# Patient Record
Sex: FEMALE | Race: WHITE | Employment: OTHER | URBAN - METROPOLITAN AREA
[De-identification: names, ages, dates, MRNs, and addresses within clinical notes are randomized per-mention and may not be internally consistent; named-entity substitution may affect disease eponyms.]

---

## 2020-01-11 ENCOUNTER — HOSPITAL ENCOUNTER (EMERGENCY)
Age: 85
Discharge: HOME | End: 2020-01-11
Attending: EMERGENCY MEDICINE
Payer: MEDICARE

## 2020-01-11 ENCOUNTER — APPOINTMENT (OUTPATIENT)
Dept: CT IMAGING | Age: 85
End: 2020-01-11
Attending: EMERGENCY MEDICINE
Payer: MEDICARE

## 2020-01-11 VITALS
DIASTOLIC BLOOD PRESSURE: 63 MMHG | RESPIRATION RATE: 17 BRPM | HEART RATE: 64 BPM | SYSTOLIC BLOOD PRESSURE: 130 MMHG | OXYGEN SATURATION: 97 %

## 2020-01-11 DIAGNOSIS — S09.90XA INJURY OF HEAD, INITIAL ENCOUNTER: Primary | ICD-10-CM
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
## 2020-01-11 DIAGNOSIS — S10.93XA CONTUSION OF NECK, INITIAL ENCOUNTER: ICD-10-CM

## 2020-01-11 PROCEDURE — 72125 CT NECK SPINE W/O DYE: CPT

## 2020-01-11 PROCEDURE — 99282 EMERGENCY DEPT VISIT SF MDM: CPT | Performed by: EMERGENCY MEDICINE

## 2020-01-11 PROCEDURE — 70450 CT HEAD/BRAIN W/O DYE: CPT

## 2020-01-11 RX ORDER — BUMETANIDE 2 MG/1
2 TABLET ORAL DAILY
Status: ON HOLD | COMMUNITY
End: 2020-12-09

## 2020-01-11 RX ORDER — ESTRADIOL 0.5 MG/1
TABLET ORAL DAILY
COMMUNITY

## 2020-01-11 RX ORDER — ASCORBIC ACID 500 MG
1000 TABLET ORAL DAILY
Status: ON HOLD | COMMUNITY
End: 2020-12-09

## 2020-01-11 RX ORDER — MODAFINIL 200 MG/1
200 TABLET ORAL DAILY
COMMUNITY

## 2020-01-11 RX ORDER — COLCHICINE 0.6 MG/1
0.6 TABLET ORAL DAILY
Status: ON HOLD | COMMUNITY
End: 2020-12-09

## 2020-01-11 RX ORDER — ESCITALOPRAM OXALATE 10 MG/1
20 TABLET ORAL DAILY
COMMUNITY

## 2020-01-11 RX ORDER — ERGOCALCIFEROL 1.25 MG/1
50000 CAPSULE ORAL
COMMUNITY

## 2020-01-11 RX ORDER — RIVASTIGMINE TARTRATE 3 MG/1
3 CAPSULE ORAL 2 TIMES DAILY WITH MEALS
COMMUNITY

## 2020-01-11 RX ORDER — TRAMADOL HYDROCHLORIDE 50 MG/1
50 TABLET ORAL
Status: ON HOLD | COMMUNITY
End: 2020-12-09 | Stop reason: SDUPTHER

## 2020-01-11 RX ORDER — MEMANTINE HYDROCHLORIDE 10 MG/1
10 TABLET ORAL 2 TIMES DAILY
COMMUNITY

## 2020-01-11 RX ORDER — CARBIDOPA AND LEVODOPA 25; 100 MG/1; MG/1
1 TABLET ORAL 3 TIMES DAILY
Status: ON HOLD | COMMUNITY
End: 2020-12-09

## 2020-01-11 RX ORDER — CALCIUM CARB/VITAMIN D3/VIT K1 650MG-12.5
TABLET,CHEWABLE ORAL
COMMUNITY

## 2020-01-11 RX ORDER — ASPIRIN 81 MG/1
81 TABLET ORAL DAILY
COMMUNITY

## 2020-01-11 RX ORDER — GUAIFENESIN 100 MG/5ML
200 SOLUTION ORAL
Status: ON HOLD | COMMUNITY
End: 2020-12-09

## 2020-01-11 RX ORDER — AMOXICILLIN AND CLAVULANATE POTASSIUM 500; 125 MG/1; MG/1
1 TABLET, FILM COATED ORAL 2 TIMES DAILY
Status: ON HOLD | COMMUNITY
End: 2020-12-09

## 2020-01-11 NOTE — ED PROVIDER NOTES
The history is provided by the patient and a relative. Fall   The accident occurred 1 to 2 hours ago. The fall occurred while standing. She fell from a height of ground level. She landed on hard floor. The point of impact was the head and neck. The pain is present in the head and neck. The pain is mild. She was ambulatory at the scene. There was no entrapment after the fall. There was no drug use involved in the accident. There was no alcohol use involved in the accident. Associated symptoms include headaches. Pertinent negatives include no numbness, no abdominal pain, no nausea, no vomiting, no extremity weakness, no loss of consciousness and no tingling. The risk factors include dementia and being elderly (On aspirin). The symptoms are aggravated by pressure on injury. She has tried nothing for the symptoms. Past Medical History:   Diagnosis Date    Dementia [facility])        History reviewed. No pertinent surgical history. History reviewed. No pertinent family history.     Social History     Socioeconomic History    Marital status:      Spouse name: Not on file    Number of children: Not on file    Years of education: Not on file    Highest education level: Not on file   Occupational History    Not on file   Social Needs    Financial resource strain: Not on file    Food insecurity:     Worry: Not on file     Inability: Not on file    Transportation needs:     Medical: Not on file     Non-medical: Not on file   Tobacco Use    Smoking status: Never Smoker    Smokeless tobacco: Never Used   Substance and Sexual Activity    Alcohol use: Never     Frequency: Never    Drug use: Never    Sexual activity: Not on file   Lifestyle    Physical activity:     Days per week: Not on file     Minutes per session: Not on file    Stress: Not on file   Relationships    Social connections:     Talks on phone: Not on file     Gets together: Not on file     Attends [employer] service: Not on file Active member of club or organization: Not on file     Attends meetings of clubs or organizations: Not on file     Relationship status: Not on file    Intimate partner violence:     Fear of current or ex partner: Not on file     Emotionally abused: Not on file     Physically abused: Not on file     Forced sexual activity: Not on file   Other Topics Concern    Not on file   Social History Narrative    Not on file         ALLERGIES: Cortisone; Morphine; and Sulfa (sulfonamide antibiotics)    Review of Systems   HENT: Negative for facial swelling. Respiratory: Negative for shortness of breath. Cardiovascular: Negative for chest pain. Gastrointestinal: Negative for abdominal pain, nausea and vomiting. Musculoskeletal: Positive for neck pain. Negative for back pain and extremity weakness. Neurological: Positive for headaches. Negative for tingling, loss of consciousness, weakness and numbness. There were no vitals filed for this visit. Physical Exam  Vitals signs and nursing note reviewed. Constitutional:       Appearance: She is well-developed. HENT:      Head: Normocephalic and atraumatic. Comments: No visible contusion abrasion or laceration on the posterior scalp there is an area of tenderness in the occipital prominence area. Patient complains of mild pain here. Eyes:      Conjunctiva/sclera: Conjunctivae normal.      Pupils: Pupils are equal, round, and reactive to light. Neck:      Musculoskeletal: Spinous process tenderness and muscular tenderness present. Trachea: Trachea normal.   Cardiovascular:      Rate and Rhythm: Normal rate and regular rhythm. Heart sounds: Normal heart sounds. Pulmonary:      Effort: Pulmonary effort is normal.      Breath sounds: Normal breath sounds. Chest:      Chest wall: No tenderness. Abdominal:      General: Bowel sounds are normal. There is no distension. Palpations: Abdomen is soft. Tenderness:  There is no tenderness. There is no guarding or rebound. Musculoskeletal: Normal range of motion. General: No tenderness. Comments: No midline thoracic or lumbar tenderness. Extremities with full range of motion without pain and no bony tenderness to palpation. Pelvis is stable and nontender. Skin:     General: Skin is warm and dry. Neurological:      Mental Status: She is alert and oriented to person, place, and time. GCS: GCS eye subscore is 4. GCS verbal subscore is 5. GCS motor subscore is 6. Sensory: No sensory deficit. MDM  Number of Diagnoses or Management Options  Diagnosis management comments: Patient is alert and remembers the incident for the most part. She is a little uncertain as to why she fell but she does remember standing up and wanting to use her walker. She is at her baseline demented mental status per family who is here with her. The fall was witnessed and there was no loss of consciousness. 8:12 PM  Patient and family were unaware of the old stroke we found on her CT scan but she was started on an antibiotic yesterday for sinusitis of which the CT shows signs as well. They were unaware of the spinal canal stenosis of the cervical spine but she is not having any radicular symptoms. Amount and/or Complexity of Data Reviewed  Tests in the radiology section of CPT®: ordered and reviewed (Ct Head Wo Cont    Result Date: 1/11/2020  CT of the head without contrast. CLINICAL INDICATION: Head trauma after a fall PROCEDURE: Serial thin section axial images are obtained from the cranial vertex through the skull base without the administration of intravenous contrast. Radiation dose reduction techniques were used for this study. Our CT scanners use one or all of the following: Automated exposure control, adjusted of the mA and/or kV according to patient size, iterative reconstruction COMPARISON: No prior.  FINDINGS: There is no acute intracranial hemorrhage, mass, or mass effect. No abnormal extra-axial fluid collections identified. There is no hydrocephalus. The basilar cisterns are widely patent. Encephalomalacia is noted in the paramedian left frontal lobe in keeping with prior CVA. There is moderate patchy subcortical, deep, and periventricular white matter hypoattenuation bilaterally. Mild atrophy is noted. No skull fracture or aggressive osseous lesion noted. Patchy opacification is noted throughout the right frontal, right greater than left ethmoid air cells with trace air-fluid levels in the maxillary sinuses. The mastoid air cells are clear. IMPRESSION: 1. No acute intracranial abnormality. 2. Moderate chronic white matter microangiopathic changes. 3. 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 4. Mild cerebral atrophy. 5. Nonspecific patchy opacification involving the right frontal, bilateral right greater than left ethmoid air cells, and maxillary sinuses. Sinusitis could be considered. Ct Spine Cerv Wo Cont    Result Date: 1/11/2020  EXAM: CT SPINE CERV WO CONT INDICATION: Upper cervical spine pain and tenderness status post falling backwards to the floor-fracture? COMPARISON: None. TECHNIQUE:  Unenhanced multislice helical CT of the cervical spine was performed in the axial plane. Sagittal and coronal reconstructions were obtained. CT dose reduction was achieved through use of a standardized protocol tailored for this examination and automatic exposure control for dose modulation. FINDINGS: The cervical vertebral bodies are normal in height and alignment. No fracture noted. There is multilevel degenerative disc disease throughout the entirety of the cervical spine. The C1-C2 articulation is intact and normal alignment. The craniocervical junction is unremarkable. Central spinal stenosis is evident at C4-C5 and C6-C7 from degenerative disc disease. Axial images: The anterior and posterior arches of C1 are intact. The foramina transversarium are patent throughout. No fractures identified on the axial images. Limited evaluation the paraspinal soft tissues is unremarkable. IMPRESSION: 1. No acute osseous abnormality or malalignment of the cervical spine. 2. Extensive multilevel degenerative spondylosis. Central spinal stenosis is suspected at least at the C4-C5 and C6-C7 levels.  If the patient has myelopathic or radicular symptoms, consider further evaluation with cervical spine MRI.    )           Procedures

## 2020-01-11 NOTE — ED TRIAGE NOTES
Pt in from [address] via ems for head pain after trip and fall. Pt was getting out of bed and her walker slid away from her. Fall was witnessed by staff per ems. No loc. Pt at baseline per ems.

## 2020-01-12 NOTE — DISCHARGE INSTRUCTIONS
Patient Education   Tylenol for pain. Ice to the sore areas for 15 minutes every 4 hours while awake for 2 to 3 days. Return if worsening severe headaches associated with nausea and vomiting or any significant change in mental status. You may continue aspirin once daily. Continue antibiotic as the CT scan did show signs of sinusitis. Follow-up with your doctor later this coming week for recheck. Preventing Falls: Care Instructions  Your Care Instructions    Getting around your home safely can be a challenge if you have injuries or health problems that make it easy for you to fall. Loose rugs and furniture in walkways are among the dangers for many older people who have problems walking or who have poor eyesight. People who have conditions such as arthritis, osteoporosis, or dementia also have to be careful not to fall. You can make your home safer with a few simple measures. Follow-up care is a key part of your treatment and safety. Be sure to make and go to all appointments, and call your doctor if you are having problems. It's also a good idea to know your test results and keep a list of the medicines you take. How can you care for yourself at home? Taking care of yourself  · You may get dizzy if you do not drink enough water. To prevent dehydration, drink plenty of fluids, enough so that your urine is light yellow or clear like water. Choose water and other caffeine-free clear liquids. If you have kidney, heart, or liver disease and have to limit fluids, talk with your doctor before you increase the amount of fluids you drink. · Exercise regularly to improve your strength, muscle tone, and balance. Walk if you can. Swimming may be a good choice if you cannot walk easily. · Have your vision and hearing checked each year or any time you notice a change. If you have trouble seeing and hearing, you might not be able to avoid objects and could lose your balance.   · Know the side effects of the medicines you take. Ask your doctor or pharmacist whether the medicines you take can affect your balance. Sleeping pills or sedatives can affect your balance. · Limit the amount of alcohol you drink. Alcohol can impair your balance and other senses. · Ask your doctor whether calluses or corns on your feet need to be removed. If you wear loose-fitting shoes because of calluses or corns, you can lose your balance and fall. · Talk to your doctor if you have numbness in your feet. Preventing falls at home  · Remove raised doorway thresholds, throw rugs, and clutter. Repair loose carpet or raised areas in the floor. · Move furniture and electrical cords to keep them out of walking paths. · Use nonskid floor wax, and wipe up spills right away, especially on ceramic tile floors. · If you use a walker or cane, put rubber tips on it. If you use crutches, clean the bottoms of them regularly with an abrasive pad, such as steel wool. · Keep your house well lit, especially [place], and outside walkways. Use night-lights in areas such as hallways and bathrooms. Add extra light switches or use remote switches (such as switches that go on or off when you clap your hands) to make it easier to turn lights on if you have to get up during the night. · Install sturdy handrails on stairways. · Move items in your cabinets so that the things you use a lot are on the lower shelves (about waist level). · Keep a cordless phone and a flashlight with new batteries by your bed. If possible, put a phone in each of the main rooms of your house, or carry a cell phone in case you fall and cannot reach a phone. Or, you can wear a device around your neck or wrist. You push a button that sends a signal for help. · Wear low-heeled shoes that fit well and give your feet good support. Use footwear with nonskid soles. Check the heels and soles of your shoes for wear. Repair or replace worn heels or soles.   · Do not wear socks without shoes on wood floors. · Walk on the grass when the sidewalks are slippery. If you live in an area that gets snow and ice in the winter, sprinkle salt on slippery steps and sidewalks. Preventing falls in the bath  · Install grab bars and nonskid mats inside and outside your shower or tub and near the toilet and sinks. · Use shower chairs and bath benches. · Use a hand-held shower head that will allow you to sit while showering. · Get into a tub or shower by putting the weaker leg in first. Get out of a tub or shower with your strong side first.  · Repair loose toilet seats and consider installing a raised toilet seat to make getting on and off the toilet easier. · Keep your bathroom door unlocked while you are in the shower. Where can you learn more? Go to http://rhonda-joseph.info/. Enter [phone] [id number] [phone] in the search box to learn more about \"Preventing Falls: Care Instructions. \"  Current as of: November 7, 2018  Content Version: 12.2  © 1452-3947 [employer]. Care instructions adapted under license by [employer] (which disclaims liability or warranty for this information). If you have questions about a medical condition or this instruction, always ask your healthcare professional. [place] [address] any warranty or liability for your use of this information.

## 2020-12-02 ENCOUNTER — APPOINTMENT (OUTPATIENT)
Dept: GENERAL RADIOLOGY | Age: 85
DRG: 948 | End: 2020-12-02
Attending: STUDENT IN AN ORGANIZED HEALTH CARE EDUCATION/TRAINING PROGRAM
Payer: MEDICARE

## 2020-12-02 ENCOUNTER — HOSPITAL ENCOUNTER (INPATIENT)
Age: 85
LOS: 2 days | Discharge: NURSING, REHAB OR LONG-TERM CARE | DRG: 948 | End: 2020-12-10
Attending: STUDENT IN AN ORGANIZED HEALTH CARE EDUCATION/TRAINING PROGRAM | Admitting: INTERNAL MEDICINE
Payer: MEDICARE

## 2020-12-02 DIAGNOSIS — R53.1 WEAKNESS: ICD-10-CM

## 2020-12-02 DIAGNOSIS — N17.9 AKI (ACUTE KIDNEY INJURY) (HCC): ICD-10-CM

## 2020-12-02 DIAGNOSIS — R09.02 HYPOXIA: Primary | ICD-10-CM

## 2020-12-02 LAB
ALBUMIN SERPL-MCNC: 3.6 G/DL (ref 3.2–4.6)
ALBUMIN/GLOB SERPL: 0.9 {RATIO} (ref 1.2–3.5)
ALP SERPL-CCNC: 89 U/L (ref 50–136)
ALT SERPL-CCNC: 28 U/L (ref 12–65)
ANION GAP SERPL CALC-SCNC: 6 MMOL/L (ref 7–16)
AST SERPL-CCNC: 21 U/L (ref 15–37)
BACTERIA URNS QL MICRO: 0 /HPF
BASOPHILS # BLD: 0 K/UL (ref 0–0.2)
BASOPHILS NFR BLD: 0 % (ref 0–2)
BILIRUB SERPL-MCNC: 0.3 MG/DL (ref 0.2–1.1)
BNP SERPL-MCNC: 143 PG/ML
BUN SERPL-MCNC: 20 MG/DL (ref 8–23)
CALCIUM SERPL-MCNC: 10.3 MG/DL (ref 8.3–10.4)
CASTS URNS QL MICRO: NORMAL /LPF
CHLORIDE SERPL-SCNC: 99 MMOL/L (ref 98–107)
CO2 SERPL-SCNC: 35 MMOL/L (ref 21–32)
CREAT SERPL-MCNC: 1.16 MG/DL (ref 0.6–1)
DIFFERENTIAL METHOD BLD: ABNORMAL
EOSINOPHIL # BLD: 0.9 K/UL (ref 0–0.8)
EOSINOPHIL NFR BLD: 8 % (ref 0.5–7.8)
EPI CELLS #/AREA URNS HPF: NORMAL /HPF
ERYTHROCYTE [DISTWIDTH] IN BLOOD BY AUTOMATED COUNT: 12.8 % (ref 11.9–14.6)
GLOBULIN SER CALC-MCNC: 4.2 G/DL (ref 2.3–3.5)
GLUCOSE SERPL-MCNC: 114 MG/DL (ref 65–100)
HCT VFR BLD AUTO: 44.2 % (ref 35.8–46.3)
HGB BLD-MCNC: 14.6 G/DL (ref 11.7–15.4)
IMM GRANULOCYTES # BLD AUTO: 0 K/UL (ref 0–0.5)
IMM GRANULOCYTES NFR BLD AUTO: 0 % (ref 0–5)
LYMPHOCYTES # BLD: 2.1 K/UL (ref 0.5–4.6)
LYMPHOCYTES NFR BLD: 18 % (ref 13–44)
MCH RBC QN AUTO: 30.6 PG (ref 26.1–32.9)
MCHC RBC AUTO-ENTMCNC: 33 G/DL (ref 31.4–35)
MCV RBC AUTO: 92.7 FL (ref 79.6–97.8)
MONOCYTES # BLD: 1.1 K/UL (ref 0.1–1.3)
MONOCYTES NFR BLD: 10 % (ref 4–12)
NEUTS SEG # BLD: 7.2 K/UL (ref 1.7–8.2)
NEUTS SEG NFR BLD: 63 % (ref 43–78)
NRBC # BLD: 0 K/UL (ref 0–0.2)
PLATELET # BLD AUTO: 344 K/UL (ref 150–450)
PMV BLD AUTO: 10.6 FL (ref 9.4–12.3)
POTASSIUM SERPL-SCNC: 3.2 MMOL/L (ref 3.5–5.1)
PROT SERPL-MCNC: 7.8 G/DL (ref 6.3–8.2)
RBC # BLD AUTO: 4.77 M/UL (ref 4.05–5.2)
RBC #/AREA URNS HPF: NORMAL /HPF
SODIUM SERPL-SCNC: 140 MMOL/L (ref 136–145)
TROPONIN-HIGH SENSITIVITY: 10.6 PG/ML (ref 0–14)
WBC # BLD AUTO: 11.4 K/UL (ref 4.3–11.1)
WBC URNS QL MICRO: NORMAL /HPF

## 2020-12-02 PROCEDURE — 73502 X-RAY EXAM HIP UNI 2-3 VIEWS: CPT

## 2020-12-02 PROCEDURE — 85025 COMPLETE CBC W/AUTO DIFF WBC: CPT

## 2020-12-02 PROCEDURE — 83880 ASSAY OF NATRIURETIC PEPTIDE: CPT

## 2020-12-02 PROCEDURE — 74011250636 HC RX REV CODE- 250/636: Performed by: STUDENT IN AN ORGANIZED HEALTH CARE EDUCATION/TRAINING PROGRAM

## 2020-12-02 PROCEDURE — 96361 HYDRATE IV INFUSION ADD-ON: CPT

## 2020-12-02 PROCEDURE — 81015 MICROSCOPIC EXAM OF URINE: CPT

## 2020-12-02 PROCEDURE — 71045 X-RAY EXAM CHEST 1 VIEW: CPT

## 2020-12-02 PROCEDURE — 99285 EMERGENCY DEPT VISIT HI MDM: CPT

## 2020-12-02 PROCEDURE — 80053 COMPREHEN METABOLIC PANEL: CPT

## 2020-12-02 PROCEDURE — 96360 HYDRATION IV INFUSION INIT: CPT

## 2020-12-02 PROCEDURE — 84484 ASSAY OF TROPONIN QUANT: CPT

## 2020-12-02 RX ADMIN — SODIUM CHLORIDE 500 ML: 900 INJECTION, SOLUTION INTRAVENOUS at 19:47

## 2020-12-02 NOTE — ED PROVIDER NOTES
Patient is 61-year-old female, oriented to person only during my exam.  Patient complaining of left leg pain. Per EMS, patient was sent to the emergency department for evaluation of increased swelling to bilateral lower extremities. I spoke to patient's assisted living who states she has a history of dementia, has had decreased appetite over the last few days, complaining of lower extremity pain without a history of a fall. She was sent to the emergency department for evaluation. They are unsure patient had a fever at the facility. Denies nausea, vomiting, diarrhea, cough or congestion. Past Medical History:   Diagnosis Date    Dementia ([facility] [address]        No past surgical history on file. No family history on file.     Social History     Socioeconomic History    Marital status:      Spouse name: Not on file    Number of children: Not on file    Years of education: Not on file    Highest education level: Not on file   Occupational History    Not on file   Social Needs    Financial resource strain: Not on file    Food insecurity     Worry: Not on file     Inability: Not on file    Transportation needs     Medical: Not on file     Non-medical: Not on file   Tobacco Use    Smoking status: Never Smoker    Smokeless tobacco: Never Used   Substance and Sexual Activity    Alcohol use: Never     Frequency: Never    Drug use: Never    Sexual activity: Not on file   Lifestyle    Physical activity     Days per week: Not on file     Minutes per session: Not on file    Stress: Not on file   Relationships    Social connections     Talks on phone: Not on file     Gets together: Not on file     Attends Congregational service: Not on file     Active member of club or organization: Not on file     Attends meetings of clubs or organizations: Not on file     Relationship status: Not on file    Intimate partner violence     Fear of current or ex partner: Not on file     Emotionally abused: Not on file Physically abused: Not on file     Forced sexual activity: Not on file   Other Topics Concern    Not on file   Social History Narrative    Not on file         ALLERGIES: Cortisone; Morphine; and Sulfa (sulfonamide antibiotics)    Review of Systems   Unable to perform ROS: Dementia       Vitals:    12/02/20 1711   BP: (!) 166/73   Pulse: 75   Resp: 18   Temp: 98.3 °F (36.8 °C)   SpO2: 94%            Physical Exam  Vitals signs and nursing note reviewed. Constitutional:       Appearance: Normal appearance. She is not ill-appearing or toxic-appearing. HENT:      Head: Normocephalic and atraumatic. Nose: Nose normal.      Mouth/Throat:      Mouth: Mucous membranes are moist.   Eyes:      Extraocular Movements: Extraocular movements intact. Neck:      Musculoskeletal: Normal range of motion. No neck rigidity. Cardiovascular:      Rate and Rhythm: Normal rate and regular rhythm. Pulses: Normal pulses. Heart sounds: Normal heart sounds. Pulmonary:      Effort: Pulmonary effort is normal. No respiratory distress. Breath sounds: Normal breath sounds. Abdominal:      General: Abdomen is flat. There is no distension. Palpations: Abdomen is soft. Tenderness: There is no abdominal tenderness. Musculoskeletal: Normal range of motion. Comments: Exquisite tenderness to superficial palpation of the entire left lower extremity. Patient will actively move the entire left lower extremity with complaint of severe pain. Neurovascular intact distally. Skin:     General: Skin is warm and dry. Neurological:      General: No focal deficit present. Mental Status: She is alert and oriented to person, place, and time. Psychiatric:         Mood and Affect: Mood normal.          MDM  Number of Diagnoses or Management Options  JOSH (acute kidney injury) ([facility] [address]): Hypoxia:   Diagnosis management comments: Patient was seen wearing appropriate PPE.   Due to nature of her chief complaint, a broad-based work-up was ordered. Lab resulted showing normal urinalysis, blood work shows white count 11.4, stable H&H,Potassium 3.2, creatinine 1.16, GFR 47. EKG obtained shows sinus rhythm, rate 75, QRS 82, QTc 469, normal axis, no significant ST elevation or depression. Patient was given 500 cc bolus IV fluid. Chest x-ray interpreted by radiologist suspect mild interstitial edema. BNP and troponin were normal.  Family arrived later in patient's visit, states patient has had multiple other residents at her facility that tested positive for coronavirus. Covid testing will be performed here in the emergency department. Patient O2 saturation dropped to 88% on room air while patient was at rest.  Patient was placed on supplemental oxygen. For these reasons, patient would benefit from medical admission. I spoke with the hospitalist who agreed to admit this patient for continued evaluation and treatment. Patient and family voiced understanding and agreement this plan.        Amount and/or Complexity of Data Reviewed  Clinical lab tests: ordered and reviewed  Tests in the radiology section of CPT®: ordered and reviewed  Tests in the medicine section of CPT®: ordered and reviewed    Risk of Complications, Morbidity, and/or Mortality  Presenting problems: moderate  Diagnostic procedures: moderate  Management options: low           Procedures

## 2020-12-02 NOTE — ED TRIAGE NOTES
Pt arrives via EMS from [place]. EMS reports increased leg swelling and fever. Pt is alert and oriented to self. She complains of left hip pain. Pt is afebrile in triage.

## 2020-12-03 ENCOUNTER — APPOINTMENT (OUTPATIENT)
Dept: ULTRASOUND IMAGING | Age: 85
DRG: 948 | End: 2020-12-03
Attending: INTERNAL MEDICINE
Payer: MEDICARE

## 2020-12-03 PROBLEM — G20 PARKINSONISM (HCC): Chronic | Status: ACTIVE | Noted: 2019-06-07

## 2020-12-03 PROBLEM — F02.80 DEMENTIA ASSOCIATED WITH OTHER UNDERLYING DISEASE WITHOUT BEHAVIORAL DISTURBANCE (HCC): Status: ACTIVE | Noted: 2019-06-07

## 2020-12-03 PROBLEM — Z66 DNR (DO NOT RESUSCITATE): Status: ACTIVE | Noted: 2020-12-03

## 2020-12-03 PROBLEM — G20 PARKINSONISM (HCC): Status: ACTIVE | Noted: 2019-06-07

## 2020-12-03 PROBLEM — Z20.822 SUSPECTED COVID-19 VIRUS INFECTION: Chronic | Status: ACTIVE | Noted: 2020-12-03

## 2020-12-03 PROBLEM — Z20.822 SUSPECTED COVID-19 VIRUS INFECTION: Status: ACTIVE | Noted: 2020-12-03

## 2020-12-03 LAB
ANION GAP SERPL CALC-SCNC: 6 MMOL/L (ref 7–16)
ATRIAL RATE: 138 BPM
BASOPHILS # BLD: 0 K/UL (ref 0–0.2)
BASOPHILS NFR BLD: 0 % (ref 0–2)
BUN SERPL-MCNC: 21 MG/DL (ref 8–23)
CALCIUM SERPL-MCNC: 9.1 MG/DL (ref 8.3–10.4)
CALCULATED R AXIS, ECG10: 59 DEGREES
CALCULATED T AXIS, ECG11: 39 DEGREES
CHLORIDE SERPL-SCNC: 102 MMOL/L (ref 98–107)
CO2 SERPL-SCNC: 32 MMOL/L (ref 21–32)
CREAT SERPL-MCNC: 1.07 MG/DL (ref 0.6–1)
DIAGNOSIS, 93000: NORMAL
DIFFERENTIAL METHOD BLD: NORMAL
EOSINOPHIL # BLD: 0.7 K/UL (ref 0–0.8)
EOSINOPHIL NFR BLD: 7 % (ref 0.5–7.8)
ERYTHROCYTE [DISTWIDTH] IN BLOOD BY AUTOMATED COUNT: 12.8 % (ref 11.9–14.6)
GLUCOSE SERPL-MCNC: 119 MG/DL (ref 65–100)
HCT VFR BLD AUTO: 38.2 % (ref 35.8–46.3)
HGB BLD-MCNC: 12.7 G/DL (ref 11.7–15.4)
IMM GRANULOCYTES # BLD AUTO: 0.1 K/UL (ref 0–0.5)
IMM GRANULOCYTES NFR BLD AUTO: 1 % (ref 0–5)
LYMPHOCYTES # BLD: 1.9 K/UL (ref 0.5–4.6)
LYMPHOCYTES NFR BLD: 18 % (ref 13–44)
MCH RBC QN AUTO: 30.7 PG (ref 26.1–32.9)
MCHC RBC AUTO-ENTMCNC: 33.2 G/DL (ref 31.4–35)
MCV RBC AUTO: 92.3 FL (ref 79.6–97.8)
MONOCYTES # BLD: 1.1 K/UL (ref 0.1–1.3)
MONOCYTES NFR BLD: 10 % (ref 4–12)
NEUTS SEG # BLD: 6.6 K/UL (ref 1.7–8.2)
NEUTS SEG NFR BLD: 64 % (ref 43–78)
NRBC # BLD: 0 K/UL (ref 0–0.2)
PLATELET # BLD AUTO: 317 K/UL (ref 150–450)
PMV BLD AUTO: 10.2 FL (ref 9.4–12.3)
POTASSIUM SERPL-SCNC: 3 MMOL/L (ref 3.5–5.1)
Q-T INTERVAL, ECG07: 420 MS
QRS DURATION, ECG06: 82 MS
QTC CALCULATION (BEZET), ECG08: 469 MS
RBC # BLD AUTO: 4.14 M/UL (ref 4.05–5.2)
SODIUM SERPL-SCNC: 140 MMOL/L (ref 136–145)
VENTRICULAR RATE, ECG03: 75 BPM
WBC # BLD AUTO: 10.3 K/UL (ref 4.3–11.1)

## 2020-12-03 PROCEDURE — 2709999900 HC NON-CHARGEABLE SUPPLY

## 2020-12-03 PROCEDURE — 74011250636 HC RX REV CODE- 250/636: Performed by: INTERNAL MEDICINE

## 2020-12-03 PROCEDURE — 96376 TX/PRO/DX INJ SAME DRUG ADON: CPT

## 2020-12-03 PROCEDURE — 87635 SARS-COV-2 COVID-19 AMP PRB: CPT

## 2020-12-03 PROCEDURE — 96372 THER/PROPH/DIAG INJ SC/IM: CPT

## 2020-12-03 PROCEDURE — 77030038269 HC DRN EXT URIN PURWCK BARD -A

## 2020-12-03 PROCEDURE — 93970 EXTREMITY STUDY: CPT

## 2020-12-03 PROCEDURE — 74011250636 HC RX REV CODE- 250/636: Performed by: FAMILY MEDICINE

## 2020-12-03 PROCEDURE — 99218 HC RM OBSERVATION: CPT

## 2020-12-03 PROCEDURE — 74011250637 HC RX REV CODE- 250/637: Performed by: FAMILY MEDICINE

## 2020-12-03 PROCEDURE — 85025 COMPLETE CBC W/AUTO DIFF WBC: CPT

## 2020-12-03 PROCEDURE — 96361 HYDRATE IV INFUSION ADD-ON: CPT

## 2020-12-03 PROCEDURE — 80048 BASIC METABOLIC PNL TOTAL CA: CPT

## 2020-12-03 PROCEDURE — 96374 THER/PROPH/DIAG INJ IV PUSH: CPT

## 2020-12-03 RX ORDER — MEMANTINE HYDROCHLORIDE 5 MG/1
10 TABLET ORAL EVERY 12 HOURS
Status: DISCONTINUED | OUTPATIENT
Start: 2020-12-03 | End: 2020-12-10 | Stop reason: HOSPADM

## 2020-12-03 RX ORDER — ASPIRIN 81 MG/1
81 TABLET ORAL DAILY
Status: DISCONTINUED | OUTPATIENT
Start: 2020-12-03 | End: 2020-12-10 | Stop reason: HOSPADM

## 2020-12-03 RX ORDER — ACETAMINOPHEN 325 MG/1
650 TABLET ORAL
Status: DISCONTINUED | OUTPATIENT
Start: 2020-12-03 | End: 2020-12-10 | Stop reason: HOSPADM

## 2020-12-03 RX ORDER — GUAIFENESIN 100 MG/5ML
200 SOLUTION ORAL
Status: DISCONTINUED | OUTPATIENT
Start: 2020-12-03 | End: 2020-12-10 | Stop reason: HOSPADM

## 2020-12-03 RX ORDER — ESCITALOPRAM OXALATE 10 MG/1
10 TABLET ORAL DAILY
Status: DISCONTINUED | OUTPATIENT
Start: 2020-12-03 | End: 2020-12-10 | Stop reason: HOSPADM

## 2020-12-03 RX ORDER — SODIUM CHLORIDE 0.9 % (FLUSH) 0.9 %
5-40 SYRINGE (ML) INJECTION AS NEEDED
Status: DISCONTINUED | OUTPATIENT
Start: 2020-12-03 | End: 2020-12-10 | Stop reason: HOSPADM

## 2020-12-03 RX ORDER — FAMOTIDINE 20 MG/1
20 TABLET, FILM COATED ORAL DAILY
Status: DISCONTINUED | OUTPATIENT
Start: 2020-12-04 | End: 2020-12-10 | Stop reason: HOSPADM

## 2020-12-03 RX ORDER — PROMETHAZINE HYDROCHLORIDE 25 MG/1
12.5 TABLET ORAL
Status: DISCONTINUED | OUTPATIENT
Start: 2020-12-03 | End: 2020-12-10 | Stop reason: HOSPADM

## 2020-12-03 RX ORDER — TRAMADOL HYDROCHLORIDE 50 MG/1
50 TABLET ORAL
Status: DISCONTINUED | OUTPATIENT
Start: 2020-12-03 | End: 2020-12-10 | Stop reason: HOSPADM

## 2020-12-03 RX ORDER — ASCORBIC ACID 500 MG
1000 TABLET ORAL DAILY
Status: DISCONTINUED | OUTPATIENT
Start: 2020-12-03 | End: 2020-12-10 | Stop reason: HOSPADM

## 2020-12-03 RX ORDER — POTASSIUM CHLORIDE 14.9 MG/ML
20 INJECTION INTRAVENOUS
Status: COMPLETED | OUTPATIENT
Start: 2020-12-03 | End: 2020-12-03

## 2020-12-03 RX ORDER — BUMETANIDE 1 MG/1
2 TABLET ORAL DAILY
Status: DISCONTINUED | OUTPATIENT
Start: 2020-12-03 | End: 2020-12-10 | Stop reason: HOSPADM

## 2020-12-03 RX ORDER — SODIUM CHLORIDE 0.9 % (FLUSH) 0.9 %
5-40 SYRINGE (ML) INJECTION EVERY 8 HOURS
Status: DISCONTINUED | OUTPATIENT
Start: 2020-12-03 | End: 2020-12-10 | Stop reason: HOSPADM

## 2020-12-03 RX ORDER — COLCHICINE 0.6 MG/1
0.6 TABLET ORAL DAILY
Status: DISCONTINUED | OUTPATIENT
Start: 2020-12-03 | End: 2020-12-10 | Stop reason: HOSPADM

## 2020-12-03 RX ORDER — OXYCODONE HYDROCHLORIDE 5 MG/1
5 TABLET ORAL
Status: DISCONTINUED | OUTPATIENT
Start: 2020-12-03 | End: 2020-12-10 | Stop reason: HOSPADM

## 2020-12-03 RX ORDER — POLYETHYLENE GLYCOL 3350 17 G/17G
17 POWDER, FOR SOLUTION ORAL DAILY
Status: DISCONTINUED | OUTPATIENT
Start: 2020-12-03 | End: 2020-12-10 | Stop reason: HOSPADM

## 2020-12-03 RX ORDER — RIVASTIGMINE TARTRATE 3 MG/1
3 CAPSULE ORAL 2 TIMES DAILY WITH MEALS
Status: DISCONTINUED | OUTPATIENT
Start: 2020-12-03 | End: 2020-12-10 | Stop reason: HOSPADM

## 2020-12-03 RX ORDER — ENOXAPARIN SODIUM 100 MG/ML
40 INJECTION SUBCUTANEOUS DAILY
Status: DISCONTINUED | OUTPATIENT
Start: 2020-12-03 | End: 2020-12-10 | Stop reason: HOSPADM

## 2020-12-03 RX ORDER — ACETAMINOPHEN 650 MG/1
650 SUPPOSITORY RECTAL
Status: DISCONTINUED | OUTPATIENT
Start: 2020-12-03 | End: 2020-12-10 | Stop reason: HOSPADM

## 2020-12-03 RX ORDER — ONDANSETRON 2 MG/ML
4 INJECTION INTRAMUSCULAR; INTRAVENOUS
Status: DISCONTINUED | OUTPATIENT
Start: 2020-12-03 | End: 2020-12-10 | Stop reason: HOSPADM

## 2020-12-03 RX ORDER — SODIUM CHLORIDE 9 MG/ML
75 INJECTION, SOLUTION INTRAVENOUS CONTINUOUS
Status: DISCONTINUED | OUTPATIENT
Start: 2020-12-03 | End: 2020-12-03

## 2020-12-03 RX ORDER — CARBIDOPA AND LEVODOPA 25; 100 MG/1; MG/1
1 TABLET ORAL 3 TIMES DAILY
Status: DISCONTINUED | OUTPATIENT
Start: 2020-12-03 | End: 2020-12-10 | Stop reason: HOSPADM

## 2020-12-03 RX ORDER — FAMOTIDINE 20 MG/1
20 TABLET, FILM COATED ORAL 2 TIMES DAILY
Status: DISCONTINUED | OUTPATIENT
Start: 2020-12-03 | End: 2020-12-03

## 2020-12-03 RX ADMIN — CARBIDOPA AND LEVODOPA 1 TABLET: 25; 100 TABLET ORAL at 10:01

## 2020-12-03 RX ADMIN — ESCITALOPRAM OXALATE 10 MG: 10 TABLET ORAL at 10:02

## 2020-12-03 RX ADMIN — BUMETANIDE 2 MG: 1 TABLET ORAL at 10:01

## 2020-12-03 RX ADMIN — SODIUM CHLORIDE 75 ML/HR: 900 INJECTION, SOLUTION INTRAVENOUS at 03:57

## 2020-12-03 RX ADMIN — ASPIRIN 81 MG: 81 TABLET ORAL at 10:02

## 2020-12-03 RX ADMIN — RIVASTIGMINE TARTRATE 3 MG: 1.5 CAPSULE ORAL at 10:02

## 2020-12-03 RX ADMIN — POTASSIUM CHLORIDE 20 MEQ: 200 INJECTION, SOLUTION INTRAVENOUS at 10:10

## 2020-12-03 RX ADMIN — POLYETHYLENE GLYCOL 3350 17 G: 17 POWDER, FOR SOLUTION ORAL at 10:10

## 2020-12-03 RX ADMIN — CARBIDOPA AND LEVODOPA 1 TABLET: 25; 100 TABLET ORAL at 22:24

## 2020-12-03 RX ADMIN — CARBIDOPA AND LEVODOPA 1 TABLET: 25; 100 TABLET ORAL at 17:34

## 2020-12-03 RX ADMIN — ENOXAPARIN SODIUM 40 MG: 40 INJECTION SUBCUTANEOUS at 10:07

## 2020-12-03 RX ADMIN — MEMANTINE HYDROCHLORIDE 10 MG: 5 TABLET ORAL at 10:02

## 2020-12-03 RX ADMIN — COLCHICINE 0.6 MG: 0.6 TABLET, FILM COATED ORAL at 10:02

## 2020-12-03 RX ADMIN — RIVASTIGMINE TARTRATE 3 MG: 1.5 CAPSULE ORAL at 17:34

## 2020-12-03 RX ADMIN — MEMANTINE HYDROCHLORIDE 10 MG: 5 TABLET ORAL at 22:24

## 2020-12-03 RX ADMIN — FAMOTIDINE 20 MG: 20 TABLET, FILM COATED ORAL at 10:01

## 2020-12-03 RX ADMIN — OXYCODONE HYDROCHLORIDE AND ACETAMINOPHEN 1000 MG: 500 TABLET ORAL at 10:02

## 2020-12-03 RX ADMIN — Medication 10 ML: at 22:25

## 2020-12-03 RX ADMIN — POTASSIUM CHLORIDE 20 MEQ: 200 INJECTION, SOLUTION INTRAVENOUS at 11:00

## 2020-12-03 NOTE — PROGRESS NOTES
Progress Note    Patient: Jan Libman MRN: [id number]  SSN: [id number]    YOB: 1935  Age: 82 y.o. Sex: female      Admit Date: 12/2/2020    LOS: 0 days     Subjective:   She was found in no distress. The patient was found on room air, 3 L NC. She denied pain over her legs, but they are edematous. Objective:     Vitals:    12/03/20 0009 12/03/20 0221 12/03/20 0228 12/03/20 0346   BP:   139/65 (!) 164/70   Pulse: 77 70 64 76   Resp:   18 18   Temp:   98 °F (36.7 °C) 98.1 °F (36.7 °C)   SpO2: 96% 99% 97% 90%        Intake and Output:  Current Shift: No intake/output data recorded. Last three shifts: 12/01 1901 - 12/03 0700  In: 500 [I.V.:500]  Out: -     Physical Exam:   General:  Alert, cooperative, no distress, appears stated age. Eyes:  Conjunctivae/corneas clear. PERRL, EOMs intact. Fundi benign   Ears:  Normal TMs and external ear canals both ears. Nose: Nares normal. Septum midline. Mucosa normal. No drainage or sinus tenderness. Mouth/Throat: Lips, mucosa, and tongue normal. Teeth and gums normal.   Neck: Supple, symmetrical, trachea midline, no adenopathy, thyroid: no enlargment/tenderness/nodules, no carotid bruit and no JVD. Back:   Symmetric, no curvature. ROM normal. No CVA tenderness. Lungs:   Clear to auscultation bilaterally. Heart:  Regular rate and rhythm, S1, S2 normal, no murmur, click, rub or gallop. Abdomen:   Soft, non-tender. Bowel sounds normal. No masses,  No organomegaly. Extremities: Bilateral pedal edema, mild erythema. Non tender to touch    Pulses: 2+ and symmetric all extremities. Skin: Skin color, texture, turgor normal. No rashes or lesions   Lymph nodes: Cervical, supraclavicular, and axillary nodes normal.   Neurologic: CNII-XII intact. Normal strength, sensation and reflexes throughout. Lab/Data Review: All lab results for the last 24 hours reviewed.      Assessment:     Principal Problem:    Suspected COVID-19 virus infection (12/3/2020)    Active Problems:    Dementia associated with other underlying disease without behavioral disturbance ([facility] [address] (6/7/2019)      Parkinsonism ([facility] (6/7/2019)      Plan:     -Suspected COVID-19 virus infection: This could explain her mild hypoxia, she really has no other symptoms consistent with Covid. Conservative management: zinc, vitamin c   F/u covid-19 results     -Bilateral pedal edema:  Negative for DVT  Albumin levels are normal  Echo ordered.     -Dementia associated with other underlying disease without behavioral disturbance:  Continue home meds      -Parkinsonism     DVT ppx: lovenox    Code status: DNR    Disposition: home once medically stable, possible within 1- 2 days     Signed By: Wilder Sanchez MD     December 3, 2020

## 2020-12-03 NOTE — PROGRESS NOTES
Care Management Interventions  PCP Verified by CM: Yes  Physical Therapy Consult: Yes  Occupational Therapy Consult: No  Current Support Network: Assisted Living  Confirm Follow Up Transport: Other (see comment)  Freedom of Choice List was Provided with Basic Dialogue that Supports the Patient's Individualized Plan of Care/Goals, Treatment Preferences and Shares the Quality Data Associated with the Providers?: Yes  [place] Resource Information Provided?: No  Discharge Location  Discharge Placement: Unable to determine at this time  CM called patient's daughter discuss discharge plans. Patient lives at [facility]. She uses a wc to ambulate and can transfer from the . No home 02. Her daughter told CM that she's easily confused and will become agitated if asked more than one questions at a time. PT consulted . CM following.

## 2020-12-03 NOTE — PROGRESS NOTES
TRANSFER - IN REPORT:    Verbal report received from [facility] (name) on Bart Fulton  being received from ED (unit) for routine progression of care      Report consisted of patients Situation, Background, Assessment and   Recommendations(SBAR). Information from the following report(s) SBAR, Kardex, ED Summary, Procedure Summary, Intake/Output, MAR and Recent Results was reviewed with the receiving nurse. Opportunity for questions and clarification was provided. Assessment completed upon patients arrival to unit and care assumed.

## 2020-12-03 NOTE — ED NOTES
TRANSFER - OUT REPORT:    Verbal report given RN on Bart Fulton  being transferred to [address] [place] [address] for routine progression of care       Report consisted of patients Situation, Background, Assessment and   Recommendations(SBAR). Information from the following report(s) SBAR and ED Summary was reviewed with the receiving nurse. Lines:   Peripheral IV 12/02/20 Right Hand (Active)       Peripheral IV 12/02/20 Right Hand (Active)        Opportunity for questions and clarification was provided.       Patient transported with:   O2 @ 3 liters  Tech

## 2020-12-03 NOTE — ACP (ADVANCE CARE PLANNING)
Advance Care Planning     Advance Care Planning Activator (Inpatient)  Conversation Note      Date of ACP Conversation: 12/03/20     Conversation Conducted with:   Patient with Decision Making Capacity and Healthcare Decision Maker: Named in Advance Directive or Healthcare Power of     ACP Activator: Kenn Vega    *When Decision Maker makes decisions on behalf of the incapacitated patient: Decision Maker is asked to consider and make decisions based on patient values, known preferences, or best interests. Health Care Decision Maker:    Current Designated Health Care Decision Maker:   (If there is a valid [place] named in the [facility] Makers\" box in the ACP activity, but it is not visible above, be sure to open that field and then select the health care decision maker relationship (ie \"primary\") in the blank space to the right of the name.) Validate  this information as still accurate & up-to-date; edit [place] field as needed.)    Note: Assess and validate information in current ACP documents, as indicated. If no Decision Maker listed above or available through scanned documents, then:    If no Authorized Decision Maker has previously been identified, then patient chooses [place]:  \"Who would you like to name as your primary health care decision-maker? \"    Name: Estefani Green   Relationship: daughter    number [phone]  Isaak Trent this person be reached easily? \" YES  \"Who would you like to name as your back-up decision maker? \"   Name: Palomo Chavez  Relationship: son in law Phone number: [phone]  Isaak Trent this person be reached easily? \"  yes    Note: If the relationship of these Decision-Makers to the patient does NOT follow your state's Next of Kin hierarchy, recommend that patient complete ACP document that meets state-specific requirements to allow them to act on the patient's behalf when appropriate.     Care Preferences    Ventilation: \"If you were in your present state of health and suddenly became very ill and were unable to breathe on your own, what would your preference be about the use of a ventilator (breathing machine) if it were available to you? \"      If patient would desire the use of a ventilator (breathing machine), answer \"yes\", if not \"no\":no    \"If your health worsens and it becomes clear that your chance of recovery is unlikely, what would your preference be about the use of a ventilator (breathing machine) if it were available to you? \"     Would the patient desire the use of a ventilator (breathing machine)? NO      Resuscitation  \"CPR works best to restart the heart when there is a sudden event, like a heart attack, in someone who is otherwise healthy. Unfortunately, CPR does not typically restart the heart for people who have serious health conditions or who are very sick. \"    \"In the event your heart stopped as a result of an underlying serious health condition, would you want attempts to be made to restart your heart (answer \"yes\" for attempt to resuscitate) or would you prefer a natural death (answer \"no\" for do not attempt to resuscitate)? \" no      NOTE: If the patient has a valid advance directive AND now provides care preference(s) that are inconsistent with that prior directive, advise the patient to consider either: creating a new advance directive that complies with state-specific requirements; or, if that is not possible, orally revoking that prior directive in accordance with state-specific requirements, which must be documented in the EHR. [x] Yes  [] No   Educated Patient / Exie Huh regarding differences between Advance Directives and portable DNR orders.     Length of ACP Conversation in minutes:      Conversation Outcomes:  [x] ACP discussion completed  [] Existing advance directive reviewed with patient; no changes to patient's previously recorded wishes     [] New Advance Directive completed   [] Portable Do Not Resuscitate prepared for Provider review and signature  [] POLST/POST/MOLST/MOST prepared for Provider review and signature      Follow-up plan:    [] Schedule follow-up conversation to continue planning  [] Referred individual to Provider for additional questions/concerns   [] Advised patient/agent/surrogate to review completed ACP document and update if needed with changes in condition, patient preferences or care setting     [x] This note routed to one or more involved healthcare providers

## 2020-12-03 NOTE — H&P
HOSPITALIST H&P  NAME:  Wing Montoya   Age:  82 y.o.  :   1935   MRN:   [id number]  PCP: Bridgett Aquino NP  Treatment Team: Attending Provider: Binta Escalona MD; Primary Nurse: Ramana Ruiz RN    No Order     CC: Reason for admission is: Possible Covid infection    HPI:   Patient history was obtained from the ER provider prior to seeing the patient. Patient is a 82 y.o. female who presents to the ER from an assisted living facility. EMS reported she was sent to the emergency room for leg swelling and fever. She complained to the ER of left hip and leg pain. She has been afebrile in the ER. She has had mild hypoxia however. Her facility reports having multiple Covid positive patients. Her facility also reported that she has had a decreased appetite for the last few days and has been complaining of leg pain, but no history of fall. There is no reported history of NVD, cough, shortness of breath, congestion, chest pain. Patient tells me she is here because her legs are swelling and painful, more so on the left. She reports in the past that she used to take Lasix, but this was weaned off and discontinued. She denies any other significant problems. She denies fever/chills, NVD, chest pain, abdominal pain, shortness of breath, cough, headaches. She did take off her oxygen during my exam with her, and her O2 saturation did drop slowly back down to the high 80s on room air and then I asked her to place her oxygen back on. ROS:  All systems have been reviewed and are negative except as stated in HPI or elsewhere. Past Medical History:   Diagnosis Date    Dementia ([address]       No past surgical history on file. Social History     Tobacco Use    Smoking status: Never Smoker    Smokeless tobacco: Never Used   Substance Use Topics    Alcohol use: Never     Frequency: Never      No family history on file.     FH Reviewed and non-contributory to admitting diagnosis    Allergies   Allergen Reactions    Cortisone Unknown (comments)    Morphine Unknown (comments)    Sulfa (Sulfonamide Antibiotics) Unknown (comments)      Prior to Admission Medications   Prescriptions Last Dose Informant Patient Reported? Taking?   amoxicillin-clavulanate (AUGMENTIN) 500-125 mg per tablet   Yes No   Sig: Take 1 Tab by mouth two (2) times a day. ascorbic acid, vitamin C, (VITAMIN C) 500 mg tablet   Yes No   Sig: Take 1,000 mg by mouth daily. aspirin delayed-release 81 mg tablet   Yes No   Sig: Take 81 mg by mouth daily. bumetanide (BUMEX) 2 mg tablet   Yes No   Sig: Take 2 mg by mouth daily. calcium-vitamin D3-vitamin K (VIACTIV) 650 mg-12.5 mcg-40 mcg chew   Yes No   Sig: Take  by mouth.   carbidopa-levodopa (SINEMET)  mg per tablet   Yes No   Sig: Take 1 Tab by mouth three (3) times daily. colchicine (COLCRYS) 0.6 mg tablet   Yes No   Sig: Take 0.6 mg by mouth daily. ergocalciferol (VITAMIN D2) 50,000 unit capsule   Yes No   Sig: Take 50,000 Units by mouth.   escitalopram oxalate (LEXAPRO) 10 mg tablet   Yes No   Sig: Take 10 mg by mouth daily. estradiol (ESTRACE) 0.5 mg tablet   Yes No   Sig: Take  by mouth daily. guaiFENesin (ROBITUSSIN) 100 mg/5 mL liquid   Yes No   Sig: Take 200 mg by mouth three (3) times daily as needed for Cough. memantine (NAMENDA) 10 mg tablet   Yes No   Sig: Take 10 mg by mouth two (2) times a day. modafinil (PROVIGIL) 200 mg tablet   Yes No   Sig: Take 200 mg by mouth daily. multivitamin, tx-iron-ca-min (THERA-M W/ IRON) 9 mg iron-400 mcg tab tablet   Yes No   Sig: Take 1 Tab by mouth daily. rivastigmine tartrate (EXELON) 3 mg capsule   Yes No   Sig: Take 3 mg by mouth two (2) times daily (with meals). traMADol (ULTRAM) 50 mg tablet   Yes No   Sig: Take 50 mg by mouth every six (6) hours as needed for Pain. Facility-Administered Medications: None         Objective:        Intake/Output Summary (Last 24 hours) at 12/3/2020 0041  Last data filed at 2020 2144  Gross per 24 hour   Intake 500 ml   Output    Net 500 ml      Temp (24hrs), Av.3 °F (36.8 °C), Min:98.3 °F (36.8 °C), Max:98.3 °F (36.8 °C)    Oxygen Therapy  O2 Sat (%): 96 % (20)  Pulse via Oximetry: 77 beats per minute (20)  O2 Device: Nasal cannula (20)  O2 Flow Rate (L/min): 3 l/min (20)   There is no height or weight on file to calculate BMI. Patient Vitals for the past 24 hrs:   Temp Pulse Resp BP SpO2   20  77   96 %   20  87  (!) 149/68 91 %   20 2330  78  (!) 146/72 98 %   20 2230  80 15 (!) 146/70 (!) 88 %   20 2200  73  127/60 97 %   20  66   97 %   20  75  (!) 154/68 90 %   20  74   96 %   20  76  (!) 150/65    20 1923  69  (!) 150/65 94 %   20 1840  69   97 %   20 1711 98.3 °F (36.8 °C) 75 18 (!) 166/73 94 %     Physical Exam:    General:    WD and WN, No apparent distress. Head:   Normocephalic, without obvious abnormality, atraumatic. Eyes:  PERRL; EOMI; sclera normal/non-icteric  ENT:  Hearing is normal.  Oropharynx is clear with tacky mucous membranes   Resp:    Clear to auscultation bilaterally. No Wheezing or Rhonchi. Resp are even and unlabored  Heart[de-identified]  Regular rate and rhythm,  no murmur, 2+ LE edema  Abdomen:   Soft, non-tender. Not distended. Bowel sounds normal.  hepato-splenomegaly -none  Musc/SK: Muscle strength is good and tone normal; No cyanosis. No clubbing  Skin:     Texture, turgor normal. No significant rashes or lesions.   Capillary refill < 2 sec  Neurologic: CN II - XII are grossly intact - Eye exam as noted above  Psych: Alert and oriented x 4;  Judgement and insight are normal     Data Review:   Recent Results (from the past 24 hour(s))   CBC WITH AUTOMATED DIFF    Collection Time: 20  5:53 PM   Result Value Ref Range    WBC 11.4 (H) 4.3 - 11.1 K/uL    RBC 4.77 4.05 - 5.2 M/uL    HGB 14.6 11.7 - 15.4 g/dL    HCT 44.2 35.8 - 46.3 %    MCV 92.7 79.6 - 97.8 FL    MCH 30.6 26.1 - 32.9 PG    MCHC 33.0 31.4 - 35.0 g/dL    RDW 12.8 11.9 - 14.6 %    PLATELET 308 018 - 106 K/uL    MPV 10.6 9.4 - 12.3 FL    ABSOLUTE NRBC 0.00 0.0 - 0.2 K/uL    DF AUTOMATED      NEUTROPHILS 63 43 - 78 %    LYMPHOCYTES 18 13 - 44 %    MONOCYTES 10 4.0 - 12.0 %    EOSINOPHILS 8 (H) 0.5 - 7.8 %    BASOPHILS 0 0.0 - 2.0 %    IMMATURE GRANULOCYTES 0 0.0 - 5.0 %    ABS. NEUTROPHILS 7.2 1.7 - 8.2 K/UL    ABS. LYMPHOCYTES 2.1 0.5 - 4.6 K/UL    ABS. MONOCYTES 1.1 0.1 - 1.3 K/UL    ABS. EOSINOPHILS 0.9 (H) 0.0 - 0.8 K/UL    ABS. BASOPHILS 0.0 0.0 - 0.2 K/UL    ABS. IMM. GRANS. 0.0 0.0 - 0.5 K/UL   METABOLIC PANEL, COMPREHENSIVE    Collection Time: 12/02/20  5:53 PM   Result Value Ref Range    Sodium 140 136 - 145 mmol/L    Potassium 3.2 (L) 3.5 - 5.1 mmol/L    Chloride 99 98 - 107 mmol/L    CO2 35 (H) 21 - 32 mmol/L    Anion gap 6 (L) 7 - 16 mmol/L    Glucose 114 (H) 65 - 100 mg/dL    BUN 20 8 - 23 MG/DL    Creatinine 1.16 (H) 0.6 - 1.0 MG/DL    GFR est AA 57 (L) >60 ml/min/1.73m2    GFR est non-AA 47 (L) >60 ml/min/1.73m2    Calcium 10.3 8.3 - 10.4 MG/DL    Bilirubin, total 0.3 0.2 - 1.1 MG/DL    ALT (SGPT) 28 12 - 65 U/L    AST (SGOT) 21 15 - 37 U/L    Alk.  phosphatase 89 50 - 136 U/L    Protein, total 7.8 6.3 - 8.2 g/dL    Albumin 3.6 3.2 - 4.6 g/dL    Globulin 4.2 (H) 2.3 - 3.5 g/dL    A-G Ratio 0.9 (L) 1.2 - 3.5     URINE MICROSCOPIC    Collection Time: 12/02/20  6:00 PM   Result Value Ref Range    WBC 10-20 0 /hpf    RBC 3-5 0 /hpf    Epithelial cells 0-3 0 /hpf    Bacteria 0 0 /hpf    Casts 0-3 0 /lpf   NT-PRO BNP    Collection Time: 12/02/20  9:57 PM   Result Value Ref Range    NT pro- <450 PG/ML   TROPONIN-HIGH SENSITIVITY    Collection Time: 12/02/20  9:57 PM   Result Value Ref Range    Troponin-High Sensitivity 10.6 0 - 14 pg/mL     CXR Results  (Last 48 hours) 12/02/20 2015  XR CHEST PORT Final result    Impression:  IMPRESSION:       Suspect mild interstitial edema. Narrative:  EXAMINATION: CHEST RADIOGRAPH 12/2/2020 8:15 PM       INDICATION: Cough       COMPARISON: None       TECHNIQUE: A single view of the chest was obtained. FINDINGS:        Support Devices: None       Osseous : No acute abnormality. Cardiomediastinal Silhouette: Normal in size. Lungs: The pulmonary vascularity may be mildly indistinct. No consolidation. Trace fissural thickening. Pleura: No pleural fluid or pneumothorax. Upper Abdomen: No abnormality. CT Results  (Last 48 hours)    None              Assessment and Plan: Active Hospital Problems    Diagnosis Date Noted    Suspected COVID-19 virus infection 12/03/2020    Dementia associated with other underlying disease without behavioral disturbance ([facility] [address] 06/07/2019    Parkinsonism ([facility] [address] 06/07/2019     Principal Problem:    Suspected COVID-19 virus infection (12/3/2020)  This could explain her mild hypoxia, she really has no other symptoms consistent with Covid. Active Problems:    Dementia associated with other underlying disease without behavioral disturbance ([address] (6/7/2019)    Chronic condition is stable, but may affect hospital stay; continue home medications with the following changes, if any:    Will continue to monitor and adjust treatment as needed. Parkinsonism ([address] (6/7/2019)    Chronic condition is stable, but may affect hospital stay; continue home medications with the following changes, if any:    Will continue to monitor and adjust treatment as needed. Edema: This is apparently new per the patient and facility.   Will check echocardiogram in the morning to assess for possible heart failure; she is to receive a dose of Lasix in the ER      · PLAN General  · DVT prophylaxis:  Lovenox  · Code status: DNR;  HCPOA:   · Risk: high  · Anticipated DC needs:  · Estimated LOS:  Less than 2 midnights  · Plans discussed with patient and/or caregiver; questions answered. Parts of this document were created using dragon voice recognition software.  The chart has been reviewed but errors may still be present    Med records reviewed if applicable; findings:     Critical care time if applicable:      Signed By: Kalli Varghese MD     December 3, 2020

## 2020-12-03 NOTE — PROGRESS NOTES
Admission assessment completed via doc flow sheet. Pt is alert and oriented to self only. Pt is confused with hx of dementia. Pt refused to answer any question. Database admission will be completed at a later time. Edema and redness noted to BLE. Pure wick on but pt has not voided yet. All safety measures in place. Pt is instructed to call for assistance. Bed in low and locked position. Bed alarm on. Call light within reach.

## 2020-12-04 LAB
ANION GAP SERPL CALC-SCNC: 6 MMOL/L (ref 7–16)
BASOPHILS # BLD: 0 K/UL (ref 0–0.2)
BASOPHILS NFR BLD: 0 % (ref 0–2)
BUN SERPL-MCNC: 14 MG/DL (ref 8–23)
CALCIUM SERPL-MCNC: 9.4 MG/DL (ref 8.3–10.4)
CHLORIDE SERPL-SCNC: 102 MMOL/L (ref 98–107)
CO2 SERPL-SCNC: 33 MMOL/L (ref 21–32)
CREAT SERPL-MCNC: 1.19 MG/DL (ref 0.6–1)
DIFFERENTIAL METHOD BLD: ABNORMAL
EOSINOPHIL # BLD: 0.7 K/UL (ref 0–0.8)
EOSINOPHIL NFR BLD: 6 % (ref 0.5–7.8)
ERYTHROCYTE [DISTWIDTH] IN BLOOD BY AUTOMATED COUNT: 12.8 % (ref 11.9–14.6)
GLUCOSE SERPL-MCNC: 142 MG/DL (ref 65–100)
HCT VFR BLD AUTO: 43.7 % (ref 35.8–46.3)
HGB BLD-MCNC: 14.3 G/DL (ref 11.7–15.4)
IMM GRANULOCYTES # BLD AUTO: 0 K/UL (ref 0–0.5)
IMM GRANULOCYTES NFR BLD AUTO: 0 % (ref 0–5)
LYMPHOCYTES # BLD: 2 K/UL (ref 0.5–4.6)
LYMPHOCYTES NFR BLD: 17 % (ref 13–44)
MAGNESIUM SERPL-MCNC: 2.2 MG/DL (ref 1.8–2.4)
MCH RBC QN AUTO: 31 PG (ref 26.1–32.9)
MCHC RBC AUTO-ENTMCNC: 32.7 G/DL (ref 31.4–35)
MCV RBC AUTO: 94.8 FL (ref 79.6–97.8)
MONOCYTES # BLD: 1 K/UL (ref 0.1–1.3)
MONOCYTES NFR BLD: 8 % (ref 4–12)
NEUTS SEG # BLD: 8.3 K/UL (ref 1.7–8.2)
NEUTS SEG NFR BLD: 69 % (ref 43–78)
NRBC # BLD: 0 K/UL (ref 0–0.2)
PLATELET # BLD AUTO: 346 K/UL (ref 150–450)
PMV BLD AUTO: 10.2 FL (ref 9.4–12.3)
POTASSIUM SERPL-SCNC: 3.2 MMOL/L (ref 3.5–5.1)
RBC # BLD AUTO: 4.61 M/UL (ref 4.05–5.2)
SARS COV-2, XPGCVT: NEGATIVE
SODIUM SERPL-SCNC: 141 MMOL/L (ref 138–145)
SOURCE, COVRS: NORMAL
WBC # BLD AUTO: 12 K/UL (ref 4.3–11.1)

## 2020-12-04 PROCEDURE — 36415 COLL VENOUS BLD VENIPUNCTURE: CPT

## 2020-12-04 PROCEDURE — 83735 ASSAY OF MAGNESIUM: CPT

## 2020-12-04 PROCEDURE — 97162 PT EVAL MOD COMPLEX 30 MIN: CPT

## 2020-12-04 PROCEDURE — 77010033678 HC OXYGEN DAILY

## 2020-12-04 PROCEDURE — 74011250636 HC RX REV CODE- 250/636: Performed by: FAMILY MEDICINE

## 2020-12-04 PROCEDURE — 99218 HC RM OBSERVATION: CPT

## 2020-12-04 PROCEDURE — 74011250637 HC RX REV CODE- 250/637: Performed by: FAMILY MEDICINE

## 2020-12-04 PROCEDURE — 85025 COMPLETE CBC W/AUTO DIFF WBC: CPT

## 2020-12-04 PROCEDURE — 74011250637 HC RX REV CODE- 250/637: Performed by: INTERNAL MEDICINE

## 2020-12-04 PROCEDURE — 97530 THERAPEUTIC ACTIVITIES: CPT

## 2020-12-04 PROCEDURE — 80048 BASIC METABOLIC PNL TOTAL CA: CPT

## 2020-12-04 PROCEDURE — 94760 N-INVAS EAR/PLS OXIMETRY 1: CPT

## 2020-12-04 PROCEDURE — 96372 THER/PROPH/DIAG INJ SC/IM: CPT

## 2020-12-04 RX ADMIN — Medication 10 ML: at 05:27

## 2020-12-04 RX ADMIN — FAMOTIDINE 20 MG: 20 TABLET, FILM COATED ORAL at 09:14

## 2020-12-04 RX ADMIN — ESCITALOPRAM OXALATE 10 MG: 10 TABLET ORAL at 09:14

## 2020-12-04 RX ADMIN — MEMANTINE HYDROCHLORIDE 10 MG: 5 TABLET ORAL at 09:14

## 2020-12-04 RX ADMIN — POLYETHYLENE GLYCOL 3350 17 G: 17 POWDER, FOR SOLUTION ORAL at 09:16

## 2020-12-04 RX ADMIN — CARBIDOPA AND LEVODOPA 1 TABLET: 25; 100 TABLET ORAL at 16:00

## 2020-12-04 RX ADMIN — BUMETANIDE 2 MG: 1 TABLET ORAL at 09:14

## 2020-12-04 RX ADMIN — CARBIDOPA AND LEVODOPA 1 TABLET: 25; 100 TABLET ORAL at 21:56

## 2020-12-04 RX ADMIN — COLCHICINE 0.6 MG: 0.6 TABLET, FILM COATED ORAL at 09:14

## 2020-12-04 RX ADMIN — RIVASTIGMINE TARTRATE 3 MG: 1.5 CAPSULE ORAL at 17:00

## 2020-12-04 RX ADMIN — Medication 10 ML: at 21:56

## 2020-12-04 RX ADMIN — MEMANTINE HYDROCHLORIDE 10 MG: 5 TABLET ORAL at 21:56

## 2020-12-04 RX ADMIN — ASPIRIN 81 MG: 81 TABLET ORAL at 09:14

## 2020-12-04 RX ADMIN — RIVASTIGMINE TARTRATE 3 MG: 1.5 CAPSULE ORAL at 09:15

## 2020-12-04 RX ADMIN — OXYCODONE HYDROCHLORIDE AND ACETAMINOPHEN 1000 MG: 500 TABLET ORAL at 09:14

## 2020-12-04 RX ADMIN — CARBIDOPA AND LEVODOPA 1 TABLET: 25; 100 TABLET ORAL at 09:15

## 2020-12-04 RX ADMIN — ENOXAPARIN SODIUM 40 MG: 40 INJECTION SUBCUTANEOUS at 09:15

## 2020-12-04 NOTE — PROGRESS NOTES
Am assessment completed. (see flow sheet for details). Pt has been very anxious and confused today wanting to go home. Talked with daughter gave update.

## 2020-12-04 NOTE — PROGRESS NOTES
Progress Note    Patient: Renelda Bosworth MRN: [id number]  SSN: [id number]    YOB: 1935  Age: 82 y.o. Sex: female      Admit Date: 12/2/2020    LOS: 0 days     Patient is a 82 y.o. female who presented to the ER from an assisted living facility. EMS reported she was sent to the emergency room for leg swelling and fever. She has had hypoxia. Her facility reported having multiple Covid positive patients. The patient also with bilateral leg edema and pain. She was found hypoxic and placed on supplemental oxygen. She has dementia and is confused. Her covid test came back as negative. Duplex was negative for DVT. Plan is for PT/OT, transfer to a medical floor.     Subjective:   She had no complains. More alert today. On 1 liter NC    Objective:     Vitals:    12/03/20 2014 12/03/20 2254 12/04/20 0302 12/04/20 0813   BP: (!) 122/56 (!) 128/58 125/64    Pulse: 72 70 71    Resp: 18 18 18    Temp: 97.5 °F (36.4 °C) 97.8 °F (36.6 °C) 98 °F (36.7 °C)    SpO2: 99% 97% 97% 95%        Intake and Output:  Current Shift: No intake/output data recorded. Last three shifts: 12/02 1901 - 12/04 0700  In: 500 [I.V.:500]  Out: -     Physical Exam:   General:  Alert, cooperative, no distress, appears stated age. Eyes:  Conjunctivae/corneas clear. PERRL, EOMs intact. Fundi benign   Ears:  Normal TMs and external ear canals both ears. Nose: Nares normal. Septum midline. Mucosa normal. No drainage or sinus tenderness. Mouth/Throat: Lips, mucosa, and tongue normal. Teeth and gums normal.   Neck: Supple, symmetrical, trachea midline, no adenopathy, thyroid: no enlargment/tenderness/nodules, no carotid bruit and no JVD. Back:   Symmetric, no curvature. ROM normal. No CVA tenderness. Lungs:   Clear to auscultation bilaterally. On 1 LNC    Heart:  Regular rate and rhythm, S1, S2 normal, no murmur, click, rub or gallop. Abdomen:   Soft, non-tender. Bowel sounds normal. No masses,  No organomegaly.    Extremities: Bilateral pedal edema, mild erythema. Non tender to touch    Pulses: 2+ and symmetric all extremities. Skin: Skin color, texture, turgor normal. No rashes or lesions   Lymph nodes: Cervical, supraclavicular, and axillary nodes normal.   Neurologic:  Oriented in person, place. Normal strength, sensation and reflexes throughout. Lab/Data Review: All lab results for the last 24 hours reviewed. Assessment:     Principal Problem:    Suspected COVID-19 virus infection (12/3/2020)    Active Problems:    Dementia associated with other underlying disease without behavioral disturbance ([facility] [address] (6/7/2019)      Parkinsonism ([facility] (6/7/2019)      DNR (do not resuscitate) (12/3/2020)      Plan:     -Hypoxia,  Possible related to chf, she had mild interstitial edema on cxr. bnp was normal. Complains of pedal edema  covid was negative  Wean o2 prn  Incentive spirometer  Albuterol prn  Echo. She refused it today.     -Bilateral pedal edema:  Negative for DVT  Albumin levels are normal  Echo ordered.     -Dementia associated with other underlying disease without behavioral disturbance:  Continue home meds      -Parkinsonism    -Weakness: PT/OT      DVT ppx: lovenox    Code status: DNR    Disposition: STR, possible within 1- 2 days     Signed By: Es Oliver MD     December 4, 2020

## 2020-12-04 NOTE — PROGRESS NOTES
Problem: Mobility Impaired (Adult and Pediatric)  Goal: *Acute Goals and Plan of Care (Insert Text)  Outcome: Progressing Towards Goal  Note: STG:  (1.)Ms. Garcia Spine will move from supine to sit and sit to supine , scoot up and down, and roll side to side with MINIMAL ASSIST within 3 treatment day(s). (2.)Ms. Garcia Spine will transfer from bed to chair and chair to bed with MODERATE ASSIST using the least restrictive device within 3 treatment day(s). (3.)Ms. Garcia Spine will ambulate with MODERATE ASSIST for 2 feet with the least restrictive device within 3 treatment day(s). (4.)Ms. Garcia Spine will perform seated static and dynamic balance activities x 15 minutes with STAND BY ASSIST to improve safety within 3 day(s). (5.)Ms. Garcia Spine will perform standing static and dynamic balance activities x 5 minutes with MODERATE ASSIST to improve safety within 3 day(s). (6.)Ms. Garcia Spine will maintain stable vital signs throughout all functional mobility within 3 days. LTG:  (1.)Ms. Garcia Spine will move from supine to sit and sit to supine , scoot up and down, and roll side to side in bed with CONTACT GUARD ASSIST within 7 treatment day(s). (2.)Ms. Garcia Spine will transfer from bed to chair and chair to bed with CONTACT GUARD ASSIST using the least restrictive device within 7 treatment day(s). (3.)Ms. Garcia Spine will ambulate with CONTACT GUARD ASSIST for 5 feet with the least restrictive device within 7 treatment day(s). (4.)Ms. Garcia Spine will perform seated static and dynamic balance activities x 15 minutes with SUPERVISION to improve safety within 7 day(s). (5.)Ms. Richmonda Spine will perform standing static and dynamic balance activities x 5 minutes with CONTACT GUARD ASSIST to improve safety within 7 day(s). (6.)Ms. Garcia Spine will ambulate and/or perform functional activities for 20 consecutive minutes with stable vital signs and no rests required to improve activity tolerance within 7 treatment days.  ________________________________________________________________________________________________       PHYSICAL THERAPY: Initial Assessment, Daily Note, and AM 12/4/2020  OBSERVATION: PT Visit Days : 1  Payor: Jesús Castillo / Plan: Quinton Lambert / Product Type: Managed Care Medicare /       NAME/AGE/GENDER: Tyesha Shaw is a 82 y.o. female   PRIMARY DIAGNOSIS: Suspected COVID-19 virus infection [Z20.828] Suspected COVID-19 virus infection Suspected COVID-19 virus infection        ICD-10: Treatment Diagnosis:    Generalized Muscle Weakness (M62.81)  Difficulty in walking, Not elsewhere classified (R26.2)   Precaution/Allergies:  Cortisone; Morphine; and Sulfa (sulfonamide antibiotics)      ASSESSMENT:     Ms. Junaid Harris presents with decreased strength, decreased activity tolerance which is impacting her ability to function at her baseline. She appears quite anxious this AM and requests assistance with cleaning. Able to perform rolling with moderate assistance initially, required total assistance for ed care and bathing this AM. Treatment included bed mobility, bridging in bed to improve activity tolerance. Patient A&Ox4 however quite anxious this AM and does not appears to understand her previous need for O2. Spo2 96% on room air so removed from O2 and RN notified. Fatigued after little activity this AM. Tyesha Shaw is currently functioning below her baseline and would benefit from skilled PT during acute care stay to maximize safety and independence with functional mobility.     This section established at most recent assessment   PROBLEM LIST (Impairments causing functional limitations):  Decreased Strength  Decreased Transfer Abilities  Decreased Ambulation Ability/Technique  Decreased Balance  Decreased Activity Tolerance  Decreased Pacing Skills  Increased Fatigue  Increased Shortness of Breath  Decreased Knowledge of Precautions  Decreased Nunez with Home Exercise Program  Decreased Cognition   INTERVENTIONS PLANNED: (Benefits and precautions of physical therapy have been discussed with the patient.)  Balance Exercise  Bed Mobility  Family Education  Gait Training  Home Exercise Program (HEP)  Neuromuscular Re-education/Strengthening  Therapeutic Activites  Therapeutic Exercise/Strengthening  Transfer Training     TREATMENT PLAN: Frequency/Duration: 3 times a week for duration of hospital stay  Rehabilitation Potential For Stated Goals: Good     REHAB RECOMMENDATIONS (at time of discharge pending progress):    Placement: It is my opinion, based on this patient's performance to date, that Ms. Michoacano Camacho may benefit from intensive therapy at a [address] [place] [address] after discharge due to the functional deficits listed above that are likely to improve with skilled rehabilitation and concerns that he/she may be unsafe to be unsupervised at home due to requiring significant assistance for all transfers/mobility and ADLs . Equipment:   None at this time              HISTORY:   History of Present Injury/Illness (Reason for Referral):  Patient history was obtained from the ER provider prior to seeing the patient. Patient is a 82 y.o. female who presents to the ER from an assisted living facility. EMS reported she was sent to the emergency room for leg swelling and fever. She complained to the ER of left hip and leg pain. She has been afebrile in the ER. She has had mild hypoxia however. Her facility reports having multiple Covid positive patients. Her facility also reported that she has had a decreased appetite for the last few days and has been complaining of leg pain, but no history of fall. There is no reported history of NVD, cough, shortness of breath, congestion, chest pain. Patient tells me she is here because her legs are swelling and painful, more so on the left. She reports in the past that she used to take Lasix, but this was weaned off and discontinued.   She denies any other significant problems. She denies fever/chills, NVD, chest pain, abdominal pain, shortness of breath, cough, headaches. She did take off her oxygen during my exam with her, and her O2 saturation did drop slowly back down to the high 80s on room air and then I asked her to place her oxygen back on. Past Medical History/Comorbidities:   Ms. Carrie Williamson  has a past medical history of Dementia ([facility] [address] Ms. Carrie Williamson  has no past surgical history on file. Social History/Living Environment:   Home Environment: Assisted living  Current DME Used/Available at Home: Wheelchair  Prior Level of Function/Work/Activity:  Patient lives in assisted living facility, requires assist to transfer to wheelchair. Reports she uses a walker for transfers. Number of Personal Factors/Comorbidities that affect the Plan of Care: 3+: HIGH COMPLEXITY   EXAMINATION:   Most Recent Physical Functioning:   Gross Assessment:  AROM: Generally decreased, functional  PROM: Generally decreased, functional  Strength: Generally decreased, functional  Coordination: Generally decreased, functional               Posture:  Posture (WDL): Exceptions to WDL  Posture Assessment:  Forward head  Balance:    Bed Mobility:  Rolling: Minimum assistance  Wheelchair Mobility:     Transfers:     Gait:            Body Structures Involved:  Heart  Lungs  Muscles Body Functions Affected:  Mental  Cardio  Respiratory  Neuromusculoskeletal  Movement Related Activities and Participation Affected:  Learning and Applying Knowledge  General Tasks and Demands  Mobility  Self Care  Domestic Life  Interpersonal Interactions and Relationships  Community, Social and [place]   Number of elements that affect the Plan of Care: 4+: HIGH COMPLEXITY   CLINICAL PRESENTATION:   Presentation: Evolving clinical presentation with changing clinical characteristics: MODERATE COMPLEXITY   CLINICAL DECISION MAKIN [facility] Inpatient Short Form  How much difficulty does the patient currently have. .. Unable A Lot A Little None   1. Turning over in bed (including adjusting bedclothes, sheets and blankets)? [] 1   [] 2   [x] 3   [] 4   2. Sitting down on and standing up from a chair with arms ( e.g., wheelchair, bedside commode, etc.)   [] 1   [x] 2   [] 3   [] 4   3. Moving from lying on back to sitting on the side of the bed? [] 1   [x] 2   [] 3   [] 4   How much help from another person does the patient currently need. .. Total A Lot A Little None   4. Moving to and from a bed to a chair (including a wheelchair)? [] 1   [x] 2   [] 3   [] 4   5. Need to walk in hospital room? [] 1   [x] 2   [] 3   [] 4   6. Climbing 3-5 steps with a railing? [x] 1   [] 2   [] 3   [] 4   © 2007, Trustees of [address] [place] [address], under license to [employer]. All rights reserved      Score:  Initial: 12 Most Recent: X (Date: -- )    Interpretation of Tool:  Represents activities that are increasingly more difficult (i.e. Bed mobility, Transfers, Gait). Medical Necessity:     Patient demonstrates   good   rehab potential due to higher previous functional level. Reason for Services/Other Comments:  Patient   continues to require modification of therapeutic interventions to increase complexity of exercises  . Use of outcome tool(s) and clinical judgement create a POC that gives a: Questionable prediction of patient's progress: MODERATE COMPLEXITY            TREATMENT:   (In addition to Assessment/Re-Assessment sessions the following treatments were rendered)   Pre-treatment Symptoms/Complaints:  \"I'm so glad you came. \"  Pain: Initial:   Pain Intensity 1: 0  Post Session:  0     Therapeutic Activity (8 Minutes): Therapeutic activity included Rolling and bridging in bed to improve functional Mobility, Strength, ROM, and Activity tolerance.     Braces/Orthotics/Lines/Etc:   O2 Device: Nasal cannula  Treatment/Session Assessment:    Response to Treatment:  Patient SpO2 96% on room air, left on room air and RN notified. Interdisciplinary Collaboration:   Physical Therapist  Registered Nurse  Certified Nursing Assistant/Patient Care Technician  After treatment position/precautions:   Supine in bed  Bed/Chair-wheels locked  Bed in low position  Call light within reach  RN notified   Compliance with Program/Exercises: Will assess as treatment progresses  Recommendations/Intent for next treatment session: \"Next visit will focus on advancements to more challenging activities and reduction in assistance provided\".   Total Treatment Duration:  PT Patient Time In/Time Out  Time In: 1008  Time Out: 1036    Palmira French PT, DPT

## 2020-12-05 LAB
ANION GAP SERPL CALC-SCNC: 5 MMOL/L (ref 7–16)
BASOPHILS # BLD: 0 K/UL (ref 0–0.2)
BASOPHILS NFR BLD: 0 % (ref 0–2)
BUN SERPL-MCNC: 16 MG/DL (ref 8–23)
CALCIUM SERPL-MCNC: 8.9 MG/DL (ref 8.3–10.4)
CHLORIDE SERPL-SCNC: 102 MMOL/L (ref 98–107)
CO2 SERPL-SCNC: 34 MMOL/L (ref 21–32)
CREAT SERPL-MCNC: 0.94 MG/DL (ref 0.6–1)
DIFFERENTIAL METHOD BLD: NORMAL
EOSINOPHIL # BLD: 0.6 K/UL (ref 0–0.8)
EOSINOPHIL NFR BLD: 5 % (ref 0.5–7.8)
ERYTHROCYTE [DISTWIDTH] IN BLOOD BY AUTOMATED COUNT: 12.5 % (ref 11.9–14.6)
GLUCOSE SERPL-MCNC: 118 MG/DL (ref 65–100)
HCT VFR BLD AUTO: 41.8 % (ref 35.8–46.3)
HGB BLD-MCNC: 14 G/DL (ref 11.7–15.4)
IMM GRANULOCYTES # BLD AUTO: 0 K/UL (ref 0–0.5)
IMM GRANULOCYTES NFR BLD AUTO: 0 % (ref 0–5)
LYMPHOCYTES # BLD: 1.7 K/UL (ref 0.5–4.6)
LYMPHOCYTES NFR BLD: 16 % (ref 13–44)
MCH RBC QN AUTO: 31.5 PG (ref 26.1–32.9)
MCHC RBC AUTO-ENTMCNC: 33.5 G/DL (ref 31.4–35)
MCV RBC AUTO: 93.9 FL (ref 79.6–97.8)
MONOCYTES # BLD: 0.9 K/UL (ref 0.1–1.3)
MONOCYTES NFR BLD: 8 % (ref 4–12)
NEUTS SEG # BLD: 7.4 K/UL (ref 1.7–8.2)
NEUTS SEG NFR BLD: 70 % (ref 43–78)
NRBC # BLD: 0 K/UL (ref 0–0.2)
PLATELET # BLD AUTO: 329 K/UL (ref 150–450)
PMV BLD AUTO: 10.3 FL (ref 9.4–12.3)
POTASSIUM SERPL-SCNC: 3.1 MMOL/L (ref 3.5–5.1)
RBC # BLD AUTO: 4.45 M/UL (ref 4.05–5.2)
SODIUM SERPL-SCNC: 141 MMOL/L (ref 136–145)
WBC # BLD AUTO: 10.5 K/UL (ref 4.3–11.1)

## 2020-12-05 PROCEDURE — 96372 THER/PROPH/DIAG INJ SC/IM: CPT

## 2020-12-05 PROCEDURE — 77030038269 HC DRN EXT URIN PURWCK BARD -A

## 2020-12-05 PROCEDURE — 74011250637 HC RX REV CODE- 250/637: Performed by: INTERNAL MEDICINE

## 2020-12-05 PROCEDURE — 74011250636 HC RX REV CODE- 250/636: Performed by: FAMILY MEDICINE

## 2020-12-05 PROCEDURE — 36415 COLL VENOUS BLD VENIPUNCTURE: CPT

## 2020-12-05 PROCEDURE — 80048 BASIC METABOLIC PNL TOTAL CA: CPT

## 2020-12-05 PROCEDURE — 2709999900 HC NON-CHARGEABLE SUPPLY

## 2020-12-05 PROCEDURE — 99218 HC RM OBSERVATION: CPT

## 2020-12-05 PROCEDURE — 85025 COMPLETE CBC W/AUTO DIFF WBC: CPT

## 2020-12-05 PROCEDURE — 74011250637 HC RX REV CODE- 250/637: Performed by: FAMILY MEDICINE

## 2020-12-05 RX ORDER — POTASSIUM CHLORIDE 20 MEQ/1
20 TABLET, EXTENDED RELEASE ORAL 2 TIMES DAILY
Status: DISCONTINUED | OUTPATIENT
Start: 2020-12-05 | End: 2020-12-05

## 2020-12-05 RX ORDER — LORAZEPAM 2 MG/ML
1 INJECTION INTRAMUSCULAR
Status: DISCONTINUED | OUTPATIENT
Start: 2020-12-05 | End: 2020-12-10 | Stop reason: HOSPADM

## 2020-12-05 RX ADMIN — MEMANTINE HYDROCHLORIDE 10 MG: 5 TABLET ORAL at 08:15

## 2020-12-05 RX ADMIN — Medication 10 ML: at 13:35

## 2020-12-05 RX ADMIN — POTASSIUM BICARBONATE 20 MEQ: 782 TABLET, EFFERVESCENT ORAL at 17:22

## 2020-12-05 RX ADMIN — MEMANTINE HYDROCHLORIDE 10 MG: 5 TABLET ORAL at 21:10

## 2020-12-05 RX ADMIN — CARBIDOPA AND LEVODOPA 1 TABLET: 25; 100 TABLET ORAL at 21:10

## 2020-12-05 RX ADMIN — ENOXAPARIN SODIUM 40 MG: 40 INJECTION SUBCUTANEOUS at 08:14

## 2020-12-05 RX ADMIN — OXYCODONE HYDROCHLORIDE AND ACETAMINOPHEN 1000 MG: 500 TABLET ORAL at 08:15

## 2020-12-05 RX ADMIN — Medication 10 ML: at 21:11

## 2020-12-05 RX ADMIN — FAMOTIDINE 20 MG: 20 TABLET, FILM COATED ORAL at 08:15

## 2020-12-05 RX ADMIN — CARBIDOPA AND LEVODOPA 1 TABLET: 25; 100 TABLET ORAL at 15:47

## 2020-12-05 RX ADMIN — ESCITALOPRAM OXALATE 10 MG: 10 TABLET ORAL at 08:15

## 2020-12-05 RX ADMIN — ASPIRIN 81 MG: 81 TABLET ORAL at 08:15

## 2020-12-05 RX ADMIN — RIVASTIGMINE TARTRATE 3 MG: 1.5 CAPSULE ORAL at 08:15

## 2020-12-05 RX ADMIN — POLYETHYLENE GLYCOL 3350 17 G: 17 POWDER, FOR SOLUTION ORAL at 08:17

## 2020-12-05 RX ADMIN — BUMETANIDE 2 MG: 1 TABLET ORAL at 08:15

## 2020-12-05 RX ADMIN — RIVASTIGMINE TARTRATE 3 MG: 1.5 CAPSULE ORAL at 17:22

## 2020-12-05 RX ADMIN — Medication 10 ML: at 05:29

## 2020-12-05 RX ADMIN — COLCHICINE 0.6 MG: 0.6 TABLET, FILM COATED ORAL at 08:15

## 2020-12-05 RX ADMIN — CARBIDOPA AND LEVODOPA 1 TABLET: 25; 100 TABLET ORAL at 08:15

## 2020-12-05 NOTE — PROGRESS NOTES
TRANSFER - IN REPORT:    Verbal report received from [facility], [facility] on Pamela Hwang  being received from 8th Floor for routine progression of care      Report consisted of patients Situation, Background, Assessment and   Recommendations(SBAR). Information from the following report(s) SBAR, Kardex, Intake/Output and Recent Results was reviewed with the receiving nurse. Opportunity for questions and clarification was provided. Assessment completed upon patients arrival to unit and care assumed.

## 2020-12-05 NOTE — PROGRESS NOTES
RN failed 1st attempt labs collection on pt. Tried for a 2nd stick pt. Refused. Charge RN Lio Oneil made aware. Will notified dayshift RN to get labs.

## 2020-12-05 NOTE — ROUTINE PROCESS
TRANSFER - OUT REPORT:    Verbal report given to Kaitlynn Castro RN(name) on Ramila Redd  being transferred to [address] [place] [address](unit) for routine progression of care       Report consisted of patients Situation, Background, Assessment and   Recommendations(SBAR). Information from the following report(s) SBAR, Kardex, Intake/Output and Recent Results was reviewed with the receiving nurse. Lines:   Peripheral IV 12/02/20 Right Hand (Active)   Site Assessment Clean, dry, & intact 12/05/20 0815   Phlebitis Assessment 0 12/05/20 0815   Infiltration Assessment 0 12/05/20 0815   Dressing Status Clean, dry, & intact 12/05/20 0815   Dressing Type Tape;Transparent 12/05/20 0815   Hub Color/Line Status Capped 12/05/20 0815        Opportunity for questions and clarification was provided.       Patient transported with:   O2 @ 1 liters

## 2020-12-06 LAB
ANION GAP SERPL CALC-SCNC: 7 MMOL/L (ref 7–16)
BASOPHILS # BLD: 0 K/UL (ref 0–0.2)
BASOPHILS NFR BLD: 0 % (ref 0–2)
BUN SERPL-MCNC: 20 MG/DL (ref 8–23)
CALCIUM SERPL-MCNC: 8.7 MG/DL (ref 8.3–10.4)
CHLORIDE SERPL-SCNC: 101 MMOL/L (ref 98–107)
CO2 SERPL-SCNC: 33 MMOL/L (ref 21–32)
CREAT SERPL-MCNC: 1.07 MG/DL (ref 0.6–1)
DIFFERENTIAL METHOD BLD: NORMAL
EOSINOPHIL # BLD: 0.6 K/UL (ref 0–0.8)
EOSINOPHIL NFR BLD: 7 % (ref 0.5–7.8)
ERYTHROCYTE [DISTWIDTH] IN BLOOD BY AUTOMATED COUNT: 12.6 % (ref 11.9–14.6)
GLUCOSE SERPL-MCNC: 108 MG/DL (ref 65–100)
HCT VFR BLD AUTO: 41.6 % (ref 35.8–46.3)
HGB BLD-MCNC: 13.9 G/DL (ref 11.7–15.4)
IMM GRANULOCYTES # BLD AUTO: 0 K/UL (ref 0–0.5)
IMM GRANULOCYTES NFR BLD AUTO: 0 % (ref 0–5)
LYMPHOCYTES # BLD: 2.3 K/UL (ref 0.5–4.6)
LYMPHOCYTES NFR BLD: 25 % (ref 13–44)
MCH RBC QN AUTO: 30.4 PG (ref 26.1–32.9)
MCHC RBC AUTO-ENTMCNC: 33.4 G/DL (ref 31.4–35)
MCV RBC AUTO: 91 FL (ref 79.6–97.8)
MONOCYTES # BLD: 1 K/UL (ref 0.1–1.3)
MONOCYTES NFR BLD: 11 % (ref 4–12)
NEUTS SEG # BLD: 5.4 K/UL (ref 1.7–8.2)
NEUTS SEG NFR BLD: 58 % (ref 43–78)
NRBC # BLD: 0 K/UL (ref 0–0.2)
PLATELET # BLD AUTO: 329 K/UL (ref 150–450)
PMV BLD AUTO: 10.4 FL (ref 9.4–12.3)
POTASSIUM SERPL-SCNC: 2.8 MMOL/L (ref 3.5–5.1)
RBC # BLD AUTO: 4.57 M/UL (ref 4.05–5.2)
SODIUM SERPL-SCNC: 141 MMOL/L (ref 136–145)
WBC # BLD AUTO: 9.3 K/UL (ref 4.3–11.1)

## 2020-12-06 PROCEDURE — 74011000302 HC RX REV CODE- 302: Performed by: INTERNAL MEDICINE

## 2020-12-06 PROCEDURE — 93306 TTE W/DOPPLER COMPLETE: CPT

## 2020-12-06 PROCEDURE — 36415 COLL VENOUS BLD VENIPUNCTURE: CPT

## 2020-12-06 PROCEDURE — 77030038269 HC DRN EXT URIN PURWCK BARD -A

## 2020-12-06 PROCEDURE — 99218 HC RM OBSERVATION: CPT

## 2020-12-06 PROCEDURE — 80048 BASIC METABOLIC PNL TOTAL CA: CPT

## 2020-12-06 PROCEDURE — 96372 THER/PROPH/DIAG INJ SC/IM: CPT

## 2020-12-06 PROCEDURE — 86580 TB INTRADERMAL TEST: CPT | Performed by: INTERNAL MEDICINE

## 2020-12-06 PROCEDURE — 74011250637 HC RX REV CODE- 250/637: Performed by: INTERNAL MEDICINE

## 2020-12-06 PROCEDURE — 85025 COMPLETE CBC W/AUTO DIFF WBC: CPT

## 2020-12-06 PROCEDURE — 74011250637 HC RX REV CODE- 250/637: Performed by: FAMILY MEDICINE

## 2020-12-06 PROCEDURE — 74011250636 HC RX REV CODE- 250/636: Performed by: FAMILY MEDICINE

## 2020-12-06 RX ADMIN — TUBERCULIN PURIFIED PROTEIN DERIVATIVE 5 UNITS: 5 INJECTION, SOLUTION INTRADERMAL at 18:39

## 2020-12-06 RX ADMIN — MEMANTINE HYDROCHLORIDE 10 MG: 5 TABLET ORAL at 08:20

## 2020-12-06 RX ADMIN — POTASSIUM BICARBONATE 20 MEQ: 782 TABLET, EFFERVESCENT ORAL at 17:22

## 2020-12-06 RX ADMIN — RIVASTIGMINE TARTRATE 3 MG: 1.5 CAPSULE ORAL at 17:22

## 2020-12-06 RX ADMIN — MEMANTINE HYDROCHLORIDE 10 MG: 5 TABLET ORAL at 21:08

## 2020-12-06 RX ADMIN — ENOXAPARIN SODIUM 40 MG: 40 INJECTION SUBCUTANEOUS at 08:21

## 2020-12-06 RX ADMIN — ESCITALOPRAM OXALATE 10 MG: 10 TABLET ORAL at 08:19

## 2020-12-06 RX ADMIN — OXYCODONE HYDROCHLORIDE AND ACETAMINOPHEN 1000 MG: 500 TABLET ORAL at 08:20

## 2020-12-06 RX ADMIN — TRAMADOL HYDROCHLORIDE 50 MG: 50 TABLET, FILM COATED ORAL at 08:21

## 2020-12-06 RX ADMIN — FAMOTIDINE 20 MG: 20 TABLET, FILM COATED ORAL at 08:20

## 2020-12-06 RX ADMIN — CARBIDOPA AND LEVODOPA 1 TABLET: 25; 100 TABLET ORAL at 21:08

## 2020-12-06 RX ADMIN — BUMETANIDE 2 MG: 1 TABLET ORAL at 08:20

## 2020-12-06 RX ADMIN — CARBIDOPA AND LEVODOPA 1 TABLET: 25; 100 TABLET ORAL at 17:22

## 2020-12-06 RX ADMIN — Medication 10 ML: at 15:59

## 2020-12-06 RX ADMIN — ASPIRIN 81 MG: 81 TABLET ORAL at 08:19

## 2020-12-06 RX ADMIN — RIVASTIGMINE TARTRATE 3 MG: 1.5 CAPSULE ORAL at 08:20

## 2020-12-06 RX ADMIN — POLYETHYLENE GLYCOL 3350 17 G: 17 POWDER, FOR SOLUTION ORAL at 08:21

## 2020-12-06 RX ADMIN — CARBIDOPA AND LEVODOPA 1 TABLET: 25; 100 TABLET ORAL at 08:19

## 2020-12-06 RX ADMIN — POTASSIUM BICARBONATE 20 MEQ: 782 TABLET, EFFERVESCENT ORAL at 08:20

## 2020-12-06 RX ADMIN — COLCHICINE 0.6 MG: 0.6 TABLET, FILM COATED ORAL at 08:20

## 2020-12-06 NOTE — PROGRESS NOTES
Hospitalist Note     Admit Date:  2020  4:54 PM   Name:  Jo Banks   Age:  82 y.o.  :  1935   MRN:  [id number]   PCP:  Gerard Juan NP  Treatment Team: Attending Provider: Cody Alba MD; Utilization Review: James Sun RN; Care Manager: Julieta Dodd.; Utilization Review: Ann-Marie Dumont RN    HPI/Subjective:   Ms. Lauryn Flower is an 81 y/o WF with a h/o dementia, Parkinsonism, LE edema who presented to the ER from [facility] on  with SOB and leg swelling. She had mild hypoxemia and CXR showing mild vascular congestion. US neg for DVT in either LE. COVID swab is negative. She was continued on home Bumex. O2 weaned off. TTE pending. : In bed, confused, unsure how she feels compared to admission. On RA O2 now. No other complaints  Objective:     Patient Vitals for the past 24 hrs:   Temp Pulse Resp BP SpO2   20 0706 98.2 °F (36.8 °C) 67 16 (!) 130/59 90 %   20 0325 98.5 °F (36.9 °C) 72 16 (!) 120/49 91 %   20 2314 99.1 °F (37.3 °C) 72 16 (!) 118/57 94 %   20 1952 98.2 °F (36.8 °C) 79 16 111/72 97 %   20 1624 98 °F (36.7 °C) 71 16 118/62 98 %   20 1122 97.6 °F (36.4 °C) 70 17 (!) 143/69 94 %     Oxygen Therapy  O2 Sat (%): 90 % (20 07)  Pulse via Oximetry: 70 beats per minute (20)  O2 Device: Room air (20)  O2 Flow Rate (L/min): 1 l/min (20)    There is no height or weight on file to calculate BMI. Intake/Output Summary (Last 24 hours) at 2020 1034  Last data filed at 2020 0722  Gross per 24 hour   Intake 120 ml   Output 800 ml   Net -680 ml       *Note that automatically entered I/Os may not be accurate; dependent on patient compliance with collection and accurate  by techs. General:    Well nourished. No overt distress. Mildly confused. CV:   RRR. No m/r/g. No edema. No JVD  Lungs:   CTAB. No wheezing, rhonchi, or rales.   Unlabored  Abdomen:   Soft, nontender, nondistended. Extremities: Warm and dry. No cyanosis. Skin:     No rashes. No jaundice. Normal coloration  Neuro:  No gross focal deficits. Alert    Data Reviewed:  I have reviewed all labs, meds, and studies from the last 24 hours:  Recent Results (from the past 24 hour(s))   CBC WITH AUTOMATED DIFF    Collection Time: 12/06/20  4:52 AM   Result Value Ref Range    WBC 9.3 4.3 - 11.1 K/uL    RBC 4.57 4.05 - 5.2 M/uL    HGB 13.9 11.7 - 15.4 g/dL    HCT 41.6 35.8 - 46.3 %    MCV 91.0 79.6 - 97.8 FL    MCH 30.4 26.1 - 32.9 PG    MCHC 33.4 31.4 - 35.0 g/dL    RDW 12.6 11.9 - 14.6 %    PLATELET 046 833 - 666 K/uL    MPV 10.4 9.4 - 12.3 FL    ABSOLUTE NRBC 0.00 0.0 - 0.2 K/uL    DF AUTOMATED      NEUTROPHILS 58 43 - 78 %    LYMPHOCYTES 25 13 - 44 %    MONOCYTES 11 4.0 - 12.0 %    EOSINOPHILS 7 0.5 - 7.8 %    BASOPHILS 0 0.0 - 2.0 %    IMMATURE GRANULOCYTES 0 0.0 - 5.0 %    ABS. NEUTROPHILS 5.4 1.7 - 8.2 K/UL    ABS. LYMPHOCYTES 2.3 0.5 - 4.6 K/UL    ABS. MONOCYTES 1.0 0.1 - 1.3 K/UL    ABS. EOSINOPHILS 0.6 0.0 - 0.8 K/UL    ABS. BASOPHILS 0.0 0.0 - 0.2 K/UL    ABS. IMM.  GRANS. 0.0 0.0 - 0.5 K/UL   METABOLIC PANEL, BASIC    Collection Time: 12/06/20  4:52 AM   Result Value Ref Range    Sodium 141 136 - 145 mmol/L    Potassium 2.8 (L) 3.5 - 5.1 mmol/L    Chloride 101 98 - 107 mmol/L    CO2 33 (H) 21 - 32 mmol/L    Anion gap 7 7 - 16 mmol/L    Glucose 108 (H) 65 - 100 mg/dL    BUN 20 8 - 23 MG/DL    Creatinine 1.07 (H) 0.6 - 1.0 MG/DL    GFR est AA >60 >60 ml/min/1.73m2    GFR est non-AA 52 (L) >60 ml/min/1.73m2    Calcium 8.7 8.3 - 10.4 MG/DL       Current Meds:  Current Facility-Administered Medications   Medication Dose Route Frequency    potassium bicarb-citric acid (EFFER-K) tablet 20 mEq  20 mEq Oral BID    LORazepam (ATIVAN) injection 1 mg  1 mg IntraVENous Q4H PRN    ascorbic acid (vitamin C) (VITAMIN C) tablet 1,000 mg  1,000 mg Oral DAILY    aspirin delayed-release tablet 81 mg  81 mg Oral DAILY    bumetanide (BUMEX) tablet 2 mg  2 mg Oral DAILY    carbidopa-levodopa (SINEMET)  mg per tablet 1 Tab  1 Tab Oral TID    colchicine tablet 0.6 mg  0.6 mg Oral DAILY    escitalopram oxalate (LEXAPRO) tablet 10 mg  10 mg Oral DAILY    guaiFENesin (ROBITUSSIN) 100 mg/5 mL oral liquid 200 mg  200 mg Oral TID PRN    memantine (NAMENDA) tablet 10 mg  10 mg Oral Q12H    rivastigmine tartrate (EXELON) capsule 3 mg  3 mg Oral BID WITH MEALS    traMADoL (ULTRAM) tablet 50 mg  50 mg Oral Q6H PRN    sodium chloride (NS) flush 5-40 mL  5-40 mL IntraVENous Q8H    sodium chloride (NS) flush 5-40 mL  5-40 mL IntraVENous PRN    acetaminophen (TYLENOL) tablet 650 mg  650 mg Oral Q6H PRN    Or    acetaminophen (TYLENOL) suppository 650 mg  650 mg Rectal Q6H PRN    polyethylene glycol (MIRALAX) packet 17 g  17 g Oral DAILY    promethazine (PHENERGAN) tablet 12.5 mg  12.5 mg Oral Q6H PRN    Or    ondansetron (ZOFRAN) injection 4 mg  4 mg IntraVENous Q6H PRN    enoxaparin (LOVENOX) injection 40 mg  40 mg SubCUTAneous DAILY    oxyCODONE IR (ROXICODONE) tablet 5 mg  5 mg Oral Q4H PRN    famotidine (PEPCID) tablet 20 mg  20 mg Oral DAILY       Other Studies:  No results found for this visit on 12/02/20. No results found.     All Micro Results     None          SARS-CoV-2 Lab Results  \"Novel Coronavirus\" Test: No results found for: COV2NT   \"Emergent Disease\" Test: No results found for: EDPR  \"SARS-COV-2\" Test: No results found for: XGCOVT  Rapid Test: No results found for: COVR         Assessment and Plan:     Hospital Problems as of 12/6/2020 Never Reviewed          Codes Class Noted - Resolved POA    * (Principal) Suspected COVID-19 virus infection (Chronic) ICD-10-CM: X40.409  ICD-9-CM: V01.79  12/3/2020 - Present Yes        DNR (do not resuscitate) ICD-10-CM: Z66  ICD-9-CM: V49.86  12/3/2020 - Present Yes        Dementia associated with other underlying disease without behavioral disturbance ([facility] [address] ICD-10-CM: F02.80  ICD-9-CM: 294.10  6/7/2019 - Present Yes        Parkinsonism ([address] (Chronic) ICD-10-CM: G20  ICD-9-CM: 332.0  6/7/2019 - Present Yes              Plan:  # Acute hypoxemic respiratory failure   - Resolved. COVID negative, on PO Bumex, now on RA. TTE pending. # B/l LE edema   - TTE pending. US neg for DVT in either leg. Bumex. # Dementia/Parkinsonism   - Home meds. DC planning/Dispo: Back to facility today vs tomorrow. Needs another repeat COVID within 72 hours per CM. Diet:  DIET REGULAR  DVT ppx: Lovenox    Other listed chronic conditions stable, cont current management.     Signed:  Vincent Cazares MD

## 2020-12-06 NOTE — PROGRESS NOTES
END OF SHIFT NOTE:    INTAKE/OUTPUT  12/05 0701 - 12/06 0700  In: -   Out: 800 [Urine:800]  Voiding: YES  Catheter: NO - incontinent w/ purewick  Drain:   External Female Catheter 12/05/20 (Active)   Site Assessment Clean, dry, & intact 12/05/20 2315   Repositioned Yes 12/05/20 2315   Perineal Care Yes 12/05/20 2315   Wick Changed Yes 12/05/20 2315   Suction Canister/Tubing Changed No 12/05/20 2315   Urine Output (mL) 0 ml 12/05/20 2050               Flatus: Patient does have flatus present. Stool:  0 occurrences. Characteristics:       Emesis: 0 occurrences. Characteristics:        VITAL SIGNS  Patient Vitals for the past 12 hrs:   Temp Pulse Resp BP SpO2   12/06/20 0325 98.5 °F (36.9 °C) 72 16 (!) 120/49 91 %   12/05/20 2314 99.1 °F (37.3 °C) 72 16 (!) 118/57 94 %   12/05/20 1952 98.2 °F (36.8 °C) 79 16 111/72 97 %       Pain Assessment  Pain Intensity 1: 0 (12/05/20 1624)        Patient Stated Pain Goal: 0    Ambulating  No    Shift report given to oncoming nurse at the bedside.     Denice Maldonado RN

## 2020-12-06 NOTE — PROGRESS NOTES
PT recommending SNF for patient. Per notes, patient is from [facility]. CM spoke with Shaylee Elizabeth from [facility] who advised that we can d/c patient back to her regular bed and they will do therapy with her from her LT bed. The facility will start auth tomorrow, Monday 12/7. Shaylee Elizabeth advised that they can do a rapid test on the patient when she gets to the facility, not to worry about it here since she had one on 12/2. CM attempted to call the patient's emergency contact (daughter, Arthur Delgado) to advise of information. LVM to call CM back. Patient will be getting d/c tomorrow. 11:54 Spoke with Shaylee Elizabeth from [facility] who advised that the patient is not in their system and is possibly at [facility]. . CM called Curtis Nettles with [facility] to confirm info, LVM to call me back. 1:48 : CM spoke daughter who was in the patient's room about SNF recommendation. She states that the patient has gone to a SNF before and it did not help. She request that the patient go back to [facility] and have [facility] there. MELLO spoke with Curtis Nettles who stated that this was fine. PW faxed to [facility] for [facility]. COVID test will have to be done on patient tomorrow before d/c. CM advised Laure (RN with [facility]) that the patient's family would like for the patient to have the [facility] agency that she had previously. Unknown if it was In House or Interim. 5:45 CM spoke with Howard Gomez who advised that she got the referral and due to the patient's current situation, the patient needs more care then what the [facility] can give to the patient. Patient really needs SNF. Howard Gomez spoke with the daughter about this. MELLO then called and spoke with Celia Fernández. She ask that the SNF list be sent to her email at [email or web address]. She will provide choices tomorrow, (Monday).

## 2020-12-06 NOTE — PROGRESS NOTES
Problem: Patient Education: Go to Patient Education Activity  Goal: Patient/Family Education  Outcome: Progressing Towards Goal     Problem: Falls - Risk of  Goal: *Absence of Falls  Description: Document Albino Messing Fall Risk and appropriate interventions in the flowsheet. Outcome: Progressing Towards Goal  Note: Fall Risk Interventions:  Mobility Interventions: Communicate number of staff needed for ambulation/transfer, Patient to call before getting OOB    Mentation Interventions: Door open when patient unattended, Reorient patient, Update white board, Room close to nurse's station    Medication Interventions: Patient to call before getting OOB, Teach patient to arise slowly    Elimination Interventions: Call light in reach, Patient to call for help with toileting needs              Problem: Patient Education: Go to Patient Education Activity  Goal: Patient/Family Education  Outcome: Progressing Towards Goal     Problem: Pressure Injury - Risk of  Goal: *Prevention of pressure injury  Description: Document Barrera Scale and appropriate interventions in the flowsheet.   Outcome: Progressing Towards Goal  Note: Pressure Injury Interventions:  Sensory Interventions: Assess changes in LOC, Check visual cues for pain    Moisture Interventions: Absorbent underpads, Check for incontinence Q2 hours and as needed    Activity Interventions: Increase time out of bed, Pressure redistribution bed/mattress(bed type)    Mobility Interventions: HOB 30 degrees or less, Pressure redistribution bed/mattress (bed type), PT/OT evaluation    Nutrition Interventions: Document food/fluid/supplement intake, Offer support with meals,snacks and hydration    Friction and Shear Interventions: Lift sheet                Problem: Patient Education: Go to Patient Education Activity  Goal: Patient/Family Education  Outcome: Progressing Towards Goal

## 2020-12-06 NOTE — PROGRESS NOTES
Progress Note    Patient: Jo Banks MRN: [id number]  SSN: [id number]    YOB: 1935  Age: 82 y.o. Sex: female      Admit Date: 12/2/2020    LOS: 0 days     Patient is a 82 y.o. female who presented to the ER from an assisted living facility. EMS reported she was sent to the emergency room for leg swelling and fever. She  had hypoxia. Her facility reported having multiple Covid positive patients. The patient was found to have bilateral leg edema and pain. She was found hypoxic and placed on supplemental oxygen. She has dementia and is  Usually confused. Her covid test came back  negative. Duplex was negative for DVT. She was continued on oral diuretics. No evidence of fluid overload at this point. Clinically stable for discharge. Will need placement.        Subjective:       Objective:     Vitals:    12/05/20 0315 12/05/20 0710 12/05/20 1122 12/05/20 1624   BP: 136/76 (!) 151/65 (!) 143/69 118/62   Pulse: 65 64 70 71   Resp: 20 18 17 16   Temp: 98.1 °F (36.7 °C) 98.3 °F (36.8 °C) 97.6 °F (36.4 °C) 98 °F (36.7 °C)   SpO2: 97% 92% 94% 98%        Intake and Output:  Current Shift: No intake/output data recorded. Last three shifts: 12/04 0701 - 12/05 1900  In: -   Out: 800 [Urine:800]    Physical Exam:   General:  Confused    Eyes:  Conjunctivae/corneas clear. Ears:  Normal TMs and external ear canals both ears. Nose: Nares normal. Septum midline. Mucosa normal. No drainage or sinus tenderness. Mouth/Throat: Lips, mucosa, and tongue normal. Teeth and gums normal.   Neck: Supple, symmetrical, trachea midline, no adenopathy, thyroid: no enlargment/tenderness/nodules, no carotid bruit and no JVD. Back:   Symmetric, no curvature. ROM normal. No CVA tenderness. Lungs:   Clear to auscultation bilaterally. On 1 LNC    Heart:  Regular rate and rhythm, S1, S2 normal, no murmur, click, rub or gallop. Abdomen:   Soft, non-tender. Bowel sounds normal. No masses,  No organomegaly.    Extremities: Bilateral pedal edema, mild erythema. Non tender to touch    Pulses: 2+ and symmetric all extremities. Skin: Skin color, texture, turgor normal. No rashes or lesions   Lymph nodes: Cervical, supraclavicular, and axillary nodes normal.   Neurologic:  Oriented in person, place. Normal strength, sensation and reflexes throughout. Lab/Data Review: All lab results for the last 24 hours reviewed.      Assessment:     Principal Problem:    Suspected COVID-19 virus infection (12/3/2020)    Active Problems:    Dementia associated with other underlying disease without behavioral disturbance ([facility] [address] (6/7/2019)      Parkinsonism ([facility] (6/7/2019)      DNR (do not resuscitate) (12/3/2020)      Plan:     -acute hypoxic respiratory failure   Possible related to chf, she had mild interstitial edema on cxr. bnp was normal. Complains of pedal edema  covid was negative  Off supplementary O2  Continue oral diuretics   Incentive spirometer  Albuterol prn       -Bilateral pedal edema:  Negative for DVT  Albumin levels are normal  Resolved     -Dementia associated with other underlying disease without behavioral disturbance:  Continue home meds      -Parkinsonism    -Weakness: PT/OT      DVT ppx: lovenox    Code status: DNR        Signed By: Maria Eugenia Ramírez MD     December 5, 2020

## 2020-12-06 NOTE — PROGRESS NOTES
Reviewed notes for spiritual concerns   patient is resting peacefully   theres no family present  Will continue to follow through this admission

## 2020-12-06 NOTE — PROGRESS NOTES
12/05/20 1624   Dual Skin Pressure Injury Assessment   Dual Skin Pressure Injury Assessment WDL   Second Care Provider (Based on [address] [place] [address]) [address] RN   Skin Integumentary   Skin Integumentary (WDL) WDL    Pressure  Injury Documentation No Pressure Injury Noted-Pressure Ulcer Prevention Initiated   Wound Prevention and Protection Methods   Orientation of Wound Prevention Posterior   Location of Wound Prevention Sacrum/Coccyx   Dressing Present  No   Wound Offloading (Prevention Methods) Bed, pressure reduction mattress

## 2020-12-06 NOTE — PROGRESS NOTES
END OF SHIFT NOTE:    INTAKE/OUTPUT  No intake/output data recorded. Voiding: YES  Catheter: NO  Drain:              Flatus: Patient does have flatus present. Stool:  0 occurrences. Characteristics:       Emesis: 0 occurrences. Characteristics:        VITAL SIGNS  Patient Vitals for the past 12 hrs:   Temp Pulse Resp BP SpO2   12/05/20 1624 98 °F (36.7 °C) 71 16 118/62 98 %   12/05/20 1122 97.6 °F (36.4 °C) 70 17 (!) 143/69 94 %       Pain Assessment  Pain Intensity 1: 0 (12/05/20 1624)        Patient Stated Pain Goal: 0    Ambulating  No    Shift report given to oncoming nurse at the bedside.     Dl Salas RN

## 2020-12-07 LAB
MM INDURATION POC: 0 MM (ref 0–0)
PPD POC: NEGATIVE NEGATIVE

## 2020-12-07 PROCEDURE — 74011250637 HC RX REV CODE- 250/637: Performed by: INTERNAL MEDICINE

## 2020-12-07 PROCEDURE — 74011250637 HC RX REV CODE- 250/637: Performed by: FAMILY MEDICINE

## 2020-12-07 PROCEDURE — 87635 SARS-COV-2 COVID-19 AMP PRB: CPT

## 2020-12-07 PROCEDURE — 77030038269 HC DRN EXT URIN PURWCK BARD -A

## 2020-12-07 PROCEDURE — 96372 THER/PROPH/DIAG INJ SC/IM: CPT

## 2020-12-07 PROCEDURE — 2709999900 HC NON-CHARGEABLE SUPPLY

## 2020-12-07 PROCEDURE — 74011250636 HC RX REV CODE- 250/636: Performed by: FAMILY MEDICINE

## 2020-12-07 PROCEDURE — 97530 THERAPEUTIC ACTIVITIES: CPT

## 2020-12-07 PROCEDURE — 99218 HC RM OBSERVATION: CPT

## 2020-12-07 RX ADMIN — CARBIDOPA AND LEVODOPA 1 TABLET: 25; 100 TABLET ORAL at 21:49

## 2020-12-07 RX ADMIN — COLCHICINE 0.6 MG: 0.6 TABLET, FILM COATED ORAL at 08:44

## 2020-12-07 RX ADMIN — RIVASTIGMINE TARTRATE 3 MG: 1.5 CAPSULE ORAL at 17:25

## 2020-12-07 RX ADMIN — FAMOTIDINE 20 MG: 20 TABLET, FILM COATED ORAL at 08:44

## 2020-12-07 RX ADMIN — MEMANTINE HYDROCHLORIDE 10 MG: 5 TABLET ORAL at 21:49

## 2020-12-07 RX ADMIN — CARBIDOPA AND LEVODOPA 1 TABLET: 25; 100 TABLET ORAL at 08:44

## 2020-12-07 RX ADMIN — BUMETANIDE 2 MG: 1 TABLET ORAL at 08:44

## 2020-12-07 RX ADMIN — Medication 10 ML: at 21:50

## 2020-12-07 RX ADMIN — POTASSIUM BICARBONATE 20 MEQ: 782 TABLET, EFFERVESCENT ORAL at 08:43

## 2020-12-07 RX ADMIN — MEMANTINE HYDROCHLORIDE 10 MG: 5 TABLET ORAL at 08:53

## 2020-12-07 RX ADMIN — Medication 10 ML: at 13:57

## 2020-12-07 RX ADMIN — RIVASTIGMINE TARTRATE 3 MG: 1.5 CAPSULE ORAL at 08:43

## 2020-12-07 RX ADMIN — CARBIDOPA AND LEVODOPA 1 TABLET: 25; 100 TABLET ORAL at 17:25

## 2020-12-07 RX ADMIN — ENOXAPARIN SODIUM 40 MG: 40 INJECTION SUBCUTANEOUS at 08:44

## 2020-12-07 RX ADMIN — OXYCODONE HYDROCHLORIDE AND ACETAMINOPHEN 1000 MG: 500 TABLET ORAL at 08:44

## 2020-12-07 RX ADMIN — ASPIRIN 81 MG: 81 TABLET ORAL at 08:43

## 2020-12-07 RX ADMIN — ESCITALOPRAM OXALATE 10 MG: 10 TABLET ORAL at 08:44

## 2020-12-07 NOTE — PROGRESS NOTES
END OF SHIFT NOTE:    INTAKE/OUTPUT  12/05 0701 - 12/06 0700  In: -   Out: 800 [Urine:800]  Voiding: YES  Catheter: NO, external cath  Drain:   External Female Catheter 12/05/20 (Active)   Site Assessment Clean, dry, & intact 12/06/20 1559   Repositioned Yes 12/06/20 1559   Perineal Care Yes 12/06/20 1559   Wick Changed Yes 12/06/20 1559   Suction Canister/Tubing Changed No 12/06/20 1559   Urine Output (mL) 500 ml 12/06/20 1704               Flatus: Patient does have flatus present. Stool:  0 occurrences. Characteristics:       Emesis: 0 occurrences. Characteristics:        VITAL SIGNS  Patient Vitals for the past 12 hrs:   Temp Pulse Resp BP SpO2   12/06/20 1941 98.6 °F (37 °C) 70 17 125/70 94 %   12/06/20 1601 97.5 °F (36.4 °C) 66 17 (!) 120/57 92 %   12/06/20 1117 97.9 °F (36.6 °C) 69 17 (!) 141/64 92 %       Pain Assessment  Pain Intensity 1: 0 (12/06/20 0730)        Patient Stated Pain Goal: 0    Ambulating  No    Shift report given to oncoming nurse at the bedside.     Jarrett Goldmann, RN

## 2020-12-07 NOTE — PROGRESS NOTES
Chart review complete, CM to follow up with daughter today about possible STR placement. CM will remain available for dc needs.

## 2020-12-07 NOTE — PROGRESS NOTES
Hospitalist Note     Admit Date:  2020  4:54 PM   Name:  Sander Clements   Age:  82 y.o.  :  1935   MRN:  [id number]   PCP:  Ja Pugh NP  Treatment Team: Attending Provider: Daja Desai MD; Utilization Review: Sisi Perry RN; Care Manager: eKya Velasquez.; Utilization Review: Sven Tijerina RN; Physical Therapist: Sisi Sierra, PT    HPI/Subjective:   Ms. Keya Berman is an 83 y/o WF with a h/o dementia, Parkinsonism, LE edema who presented to the ER from [facility] on  with SOB and leg swelling. She had mild hypoxemia and CXR showing mild vascular congestion. US neg for DVT in either LE. COVID swab is negative. She was continued on home Bumex. O2 weaned off. TTE pending. : Very angry this morning. Doesn't appear to be in any distress otherwise. Won't answer my questions. No other complaints  Objective:     Patient Vitals for the past 24 hrs:   Temp Pulse Resp BP SpO2   20 0741 98.6 °F (37 °C) 67 16 122/63 91 %   20 0402 99 °F (37.2 °C) 65 16 (!) 141/61 96 %   20 2328 97.6 °F (36.4 °C) 68 17 134/68 93 %   20 1941 98.6 °F (37 °C) 70 17 125/70 94 %   20 1601 97.5 °F (36.4 °C) 66 17 (!) 120/57 92 %   20 1117 97.9 °F (36.6 °C) 69 17 (!) 141/64 92 %     Oxygen Therapy  O2 Sat (%): 91 % (20 0741)  Pulse via Oximetry: 70 beats per minute (20 0813)  O2 Device: Room air (20)  O2 Flow Rate (L/min): 1 l/min (20 0815)    There is no height or weight on file to calculate BMI. Intake/Output Summary (Last 24 hours) at 2020 3850  Last data filed at 2020 1800  Gross per 24 hour   Intake 240 ml   Output 500 ml   Net -260 ml       *Note that automatically entered I/Os may not be accurate; dependent on patient compliance with collection and accurate  by techs. General:    Well nourished. No overt distress. Angry. CV:   RRR. No m/r/g. No edema. No JVD  Lungs:   CTAB.   No wheezing, rhonchi, or rales. Unlabored  Abdomen:   Soft, nontender, nondistended. Extremities: Warm and dry. No cyanosis   Skin:     No rashes. No jaundice. Normal coloration  Neuro:  No gross focal deficits. Alert    Data Reviewed:  I have reviewed all labs, meds, and studies from the last 24 hours:  No results found for this or any previous visit (from the past 24 hour(s)).     Current Meds:  Current Facility-Administered Medications   Medication Dose Route Frequency    tuberculin injection 5 Units  5 Units IntraDERMal ONCE    LORazepam (ATIVAN) injection 1 mg  1 mg IntraVENous Q4H PRN    ascorbic acid (vitamin C) (VITAMIN C) tablet 1,000 mg  1,000 mg Oral DAILY    aspirin delayed-release tablet 81 mg  81 mg Oral DAILY    bumetanide (BUMEX) tablet 2 mg  2 mg Oral DAILY    carbidopa-levodopa (SINEMET)  mg per tablet 1 Tab  1 Tab Oral TID    colchicine tablet 0.6 mg  0.6 mg Oral DAILY    escitalopram oxalate (LEXAPRO) tablet 10 mg  10 mg Oral DAILY    guaiFENesin (ROBITUSSIN) 100 mg/5 mL oral liquid 200 mg  200 mg Oral TID PRN    memantine (NAMENDA) tablet 10 mg  10 mg Oral Q12H    rivastigmine tartrate (EXELON) capsule 3 mg  3 mg Oral BID WITH MEALS    traMADoL (ULTRAM) tablet 50 mg  50 mg Oral Q6H PRN    sodium chloride (NS) flush 5-40 mL  5-40 mL IntraVENous Q8H    sodium chloride (NS) flush 5-40 mL  5-40 mL IntraVENous PRN    acetaminophen (TYLENOL) tablet 650 mg  650 mg Oral Q6H PRN    Or    acetaminophen (TYLENOL) suppository 650 mg  650 mg Rectal Q6H PRN    polyethylene glycol (MIRALAX) packet 17 g  17 g Oral DAILY    promethazine (PHENERGAN) tablet 12.5 mg  12.5 mg Oral Q6H PRN    Or    ondansetron (ZOFRAN) injection 4 mg  4 mg IntraVENous Q6H PRN    enoxaparin (LOVENOX) injection 40 mg  40 mg SubCUTAneous DAILY    oxyCODONE IR (ROXICODONE) tablet 5 mg  5 mg Oral Q4H PRN    famotidine (PEPCID) tablet 20 mg  20 mg Oral DAILY       Other Studies:  Results for orders placed or performed during the hospital encounter of 20   2D ECHO COMPLETE ADULT (TTE) W OR [address] [facility]  [address] [facility], [address] [facility]  [phone]    Transthoracic Echocardiogram  2D, M-mode, Doppler, and Color Doppler    Patient: Abby Banks  MR #: [id number]  : 1935  Age: 82 years  Gender: Female  Study date: 06-Dec-2020  Account #: [de-identified]  Height: 63 in  Weight: 150 lb  BSA: 1.71 mï¾²  Status:Routine  Location: 219  BP: 141/ 64    Allergies: CORTISONE, MORPHINE, SULFA (SULFONAMIDE ANTIBIOTICS)    Sonographer:  Autumn Solis [facility]  Group:  [id number] Cardiology  Referring Physician:  Melvyn Curling, MD  Reading Physician:  Manuella Fabry, MD [facility]    INDICATIONS: Pulmonary edema    PROCEDURE: This was a routine study. A transthoracic echocardiogram was  performed. The study included complete 2D imaging, M-mode, complete spectral  Doppler, and color Doppler. Image quality was adequate. LEFT VENTRICLE: Size was normal. Systolic function was normal. Ejection  fraction was estimated in the range of 55 % to 60 %. There were no regional  wall motion abnormalities. Wall thickness was normal. Left ventricular  diastolic function parameters were normal. Avg E/e': 11.5. RIGHT VENTRICLE: The size was normal. Systolic function was normal. The  tricuspid jet envelope definition was inadequate for estimation of RV   systolic  pressure. LEFT ATRIUM: Size was normal.    RIGHT ATRIUM: Not well visualized. SYSTEMIC VEINS: IVC: The inferior vena cava was not well visualized. AORTIC VALVE: The valve was structurally normal, tri-commissural. There was   no  evidence for stenosis. There was no insufficiency. MITRAL VALVE: Valve structure was normal. There was no evidence for stenosis. There was trivial regurgitation. TRICUSPID VALVE: The valve structure was normal. There was no evidence for  stenosis.  There was trivial regurgitation. PULMONIC VALVE: Not well visualized. There was no evidence for stenosis. There  was no insufficiency. PERICARDIUM: There was no pericardial effusion. AORTA: The root exhibited normal size. SUMMARY:    -  Left ventricle: Systolic function was normal. Ejection fraction was  estimated in the range of 55 % to 60 %. There were no regional wall motion  abnormalities. SYSTEM MEASUREMENT TABLES    2D  Ao Diam: 3.2 cm  LA Diam: 3.1 cm  %FS: 41.3 %  IVSd: 1 cm  LVIDd: 4.2 cm  LVIDs: 2.4 cm  LVOT Diam: 1.8 cm  LVPWd: 1 cm    Prepared and signed by    Neisha Holguin MD [facility]  Signed 06-Dec-2020 19:44:05         No results found. All Micro Results     None          SARS-CoV-2 Lab Results  \"Novel Coronavirus\" Test: No results found for: COV2NT   \"Emergent Disease\" Test: No results found for: EDPR  \"SARS-COV-2\" Test: No results found for: XGCOVT  Rapid Test: No results found for: COVR         Assessment and Plan:     Hospital Problems as of 12/7/2020 Never Reviewed          Codes Class Noted - Resolved POA    * (Principal) Suspected COVID-19 virus infection (Chronic) ICD-10-CM: O99.988  ICD-9-CM: V01.79  12/3/2020 - Present Yes        DNR (do not resuscitate) ICD-10-CM: Z66  ICD-9-CM: V49.86  12/3/2020 - Present Yes        Dementia associated with other underlying disease without behavioral disturbance ([facility] [address] ICD-10-CM: F02.80  ICD-9-CM: 294.10  6/7/2019 - Present Yes        Parkinsonism ([facility] [address] (Chronic) ICD-10-CM: G20  ICD-9-CM: 332.0  6/7/2019 - Present Yes              Plan:  # Acute hypoxemic respiratory failure              - Resolved. COVID negative, on PO Bumex, now on RA. TTE normal.     # B/l LE edema              - TTE pending. US neg for DVT in either leg. Bumex.     # Dementia/Parkinsonism              - Home meds. DC planning/Dispo: Medically stable for discharge but needs SNF. CM assisting.   Diet:  DIET REGULAR  DVT ppx: Lovenox    Other listed chronic conditions stable, cont current management.     Signed:  Mike Batres MD

## 2020-12-07 NOTE — PROGRESS NOTES
CM received email back from patient's daughter Mirna Trinidad) requesting that referrals be sent to 1) [address] [place] [address] 2) [facility]. Referrals sent. Patient getting COVID test done today. Daughter advised that referrals were sent and about the COVID test. Daughter asked that referral to [facility] be withdrawn. CM spoke with Rogelio Rosen from the [facility] and advised her where referrals were sent. CM also spoke with Karen Ely from [facility] and notified her that the daughter is choosing different facilities. CM following.

## 2020-12-07 NOTE — PROGRESS NOTES
END OF SHIFT NOTE:    INTAKE/OUTPUT  12/06 0701 - 12/07 0700  In: 360 [P.O.:360]  Out: 500 [Urine:500]  Voiding: YES  Catheter: NO  Drain:   External Female Catheter 12/05/20 (Active)   Site Assessment Clean, dry, & intact 12/06/20 2328   Repositioned No 12/06/20 2106   Perineal Care Yes 12/06/20 2328   Wick Changed No 12/06/20 2106   Suction Canister/Tubing Changed No 12/06/20 2106   Urine Output (mL) 500 ml 12/06/20 1704               Flatus: Patient does have flatus present. Stool:  0 occurrences. Characteristics:       Emesis: 0 occurrences. Characteristics:        VITAL SIGNS  Patient Vitals for the past 12 hrs:   Temp Pulse Resp BP SpO2   12/07/20 0402 99 °F (37.2 °C) 65 16 (!) 141/61 96 %   12/06/20 2328 97.6 °F (36.4 °C) 68 17 134/68 93 %   12/06/20 1941 98.6 °F (37 °C) 70 17 125/70 94 %       Pain Assessment  Pain Intensity 1: 0 (12/06/20 0730)        Patient Stated Pain Goal: 0    Ambulating  No    Shift report given to oncoming nurse at the bedside.     Genesis Zaman RN

## 2020-12-07 NOTE — PROGRESS NOTES
Problem: Patient Education: Go to Patient Education Activity  Goal: Patient/Family Education  Outcome: Progressing Towards Goal     Problem: Falls - Risk of  Goal: *Absence of Falls  Description: Document Nury Gerardo Fall Risk and appropriate interventions in the flowsheet. Outcome: Progressing Towards Goal  Note: Fall Risk Interventions:  Mobility Interventions: Communicate number of staff needed for ambulation/transfer, Patient to call before getting OOB    Mentation Interventions: Door open when patient unattended, Reorient patient, Room close to nurse's station, Update white board    Medication Interventions: Patient to call before getting OOB, Teach patient to arise slowly    Elimination Interventions: Call light in reach, Patient to call for help with toileting needs              Problem: Patient Education: Go to Patient Education Activity  Goal: Patient/Family Education  Outcome: Progressing Towards Goal     Problem: Pressure Injury - Risk of  Goal: *Prevention of pressure injury  Description: Document Barrera Scale and appropriate interventions in the flowsheet.   Outcome: Progressing Towards Goal  Note: Pressure Injury Interventions:  Sensory Interventions: Assess changes in LOC, Check visual cues for pain    Moisture Interventions: Absorbent underpads, Internal/External urinary devices    Activity Interventions: Increase time out of bed, Pressure redistribution bed/mattress(bed type), PT/OT evaluation    Mobility Interventions: HOB 30 degrees or less, Pressure redistribution bed/mattress (bed type), PT/OT evaluation    Nutrition Interventions: Document food/fluid/supplement intake, Offer support with meals,snacks and hydration    Friction and Shear Interventions: Lift sheet, Minimize layers                Problem: Patient Education: Go to Patient Education Activity  Goal: Patient/Family Education  Outcome: Progressing Towards Goal

## 2020-12-07 NOTE — PROGRESS NOTES
Problem: Mobility Impaired (Adult and Pediatric)  Goal: *Acute Goals and Plan of Care (Insert Text)  Outcome: Progressing Towards Goal  Note: STG:  (1.)Ms. Garcia Spine will move from supine to sit and sit to supine , scoot up and down, and roll side to side with MINIMAL ASSIST within 3 treatment day(s). (2.)Ms. Garcia Spine will transfer from bed to chair and chair to bed with MODERATE ASSIST using the least restrictive device within 3 treatment day(s). (3.)Ms. Garcia Spine will ambulate with MODERATE ASSIST for 2 feet with the least restrictive device within 3 treatment day(s). (4.)Ms. Garcia Spine will perform seated static and dynamic balance activities x 15 minutes with STAND BY ASSIST to improve safety within 3 day(s). (5.)Ms. Garcia Spine will perform standing static and dynamic balance activities x 5 minutes with MODERATE ASSIST to improve safety within 3 day(s). (6.)Ms. Garcia Spine will maintain stable vital signs throughout all functional mobility within 3 days. LTG:  (1.)Ms. Garcia Spine will move from supine to sit and sit to supine , scoot up and down, and roll side to side in bed with CONTACT GUARD ASSIST within 7 treatment day(s). (2.)Ms. Garcia Spine will transfer from bed to chair and chair to bed with CONTACT GUARD ASSIST using the least restrictive device within 7 treatment day(s). (3.)Ms. Garcia Spine will ambulate with CONTACT GUARD ASSIST for 5 feet with the least restrictive device within 7 treatment day(s). (4.)Ms. Garcia Spine will perform seated static and dynamic balance activities x 15 minutes with SUPERVISION to improve safety within 7 day(s). (5.)Ms. Richmonda Spine will perform standing static and dynamic balance activities x 5 minutes with CONTACT GUARD ASSIST to improve safety within 7 day(s). (6.)Ms. Garcia Spine will ambulate and/or perform functional activities for 20 consecutive minutes with stable vital signs and no rests required to improve activity tolerance within 7 treatment days.  ________________________________________________________________________________________________       PHYSICAL THERAPY: Daily Note and AM 12/7/2020  OBSERVATION: PT Visit Days : 2  Payor: Ankita Linares / Plan: Ples  / Product Type: Managed Care Medicare /       NAME/AGE/GENDER: Emil Escobar is a 82 y.o. female   PRIMARY DIAGNOSIS: Suspected COVID-19 virus infection [Z20.828] Suspected COVID-19 virus infection Suspected COVID-19 virus infection       ICD-10: Treatment Diagnosis:    · Generalized Muscle Weakness (M62.81)  · Difficulty in walking, Not elsewhere classified (R26.2)   Precaution/Allergies:  Cortisone; Morphine; and Sulfa (sulfonamide antibiotics)      ASSESSMENT:     Patient presents sitting up in supine agreeable to have therapy. She get very anxious with all movements and needs to have it explained to her what you are going to do before doing it. She then will assist and participate more rather than resist out of fear. [facility] exercises with increased resistance on her RLE. Supine to sit was mod/max assist with fair sitting balance on the EOB. She practiced forward rocking to better initiate sit to stand. She attempted sit to stand 2 times with use of bed elevation and was able to rise and clear her bottom using the RW for UE support with mod/max assist.  She tends to keep her weight posterior to the mid-line and becomes fearful about falling. She returned sit to supine with max assist and max assist x 2 was needed to position in the bed. She appears to be making slow progress but remains limited by fear of falling and stiffness with movements. She was kind and cooperative with therapy today. This section established at most recent assessment   PROBLEM LIST (Impairments causing functional limitations):  1. Decreased Strength  2. Decreased Transfer Abilities  3. Decreased Ambulation Ability/Technique  4. Decreased Balance  5.  Decreased Activity Tolerance  6. Decreased Pacing Skills  7. Increased Fatigue  8. Increased Shortness of Breath  9. Decreased Knowledge of Precautions  10. Decreased [place] with Home Exercise Program  11. Decreased Cognition   INTERVENTIONS PLANNED: (Benefits and precautions of physical therapy have been discussed with the patient.)  1. Balance Exercise  2. Bed Mobility  3. Family Education  4. Gait Training  5. Home Exercise Program (HEP)  6. Neuromuscular Re-education/Strengthening  7. Therapeutic Activites  8. Therapeutic Exercise/Strengthening  9. Transfer Training     TREATMENT PLAN: Frequency/Duration: 3 times a week for duration of hospital stay  Rehabilitation Potential For Stated Goals: Good     REHAB RECOMMENDATIONS (at time of discharge pending progress):    Placement: It is my opinion, based on this patient's performance to date, that Ms. David Conroy may benefit from intensive therapy at a [address] [place] [address] after discharge due to the functional deficits listed above that are likely to improve with skilled rehabilitation and concerns that he/she may be unsafe to be unsupervised at home due to requiring significant assistance for all transfers/mobility and ADLs . Equipment:    None at this time              HISTORY:   History of Present Injury/Illness (Reason for Referral):  Patient history was obtained from the ER provider prior to seeing the patient. Patient is a 82 y.o. female who presents to the ER from an assisted living facility. EMS reported she was sent to the emergency room for leg swelling and fever. She complained to the ER of left hip and leg pain. She has been afebrile in the ER. She has had mild hypoxia however. Her facility reports having multiple Covid positive patients. Her facility also reported that she has had a decreased appetite for the last few days and has been complaining of leg pain, but no history of fall.   There is no reported history of NVD, cough, shortness of breath, congestion, chest pain. Patient tells me she is here because her legs are swelling and painful, more so on the left. She reports in the past that she used to take Lasix, but this was weaned off and discontinued. She denies any other significant problems. She denies fever/chills, NVD, chest pain, abdominal pain, shortness of breath, cough, headaches. She did take off her oxygen during my exam with her, and her O2 saturation did drop slowly back down to the high 80s on room air and then I asked her to place her oxygen back on. Past Medical History/Comorbidities:   Ms. Avel Stuart  has a past medical history of Dementia ([facility] [address] Ms. Avel Stuart  has no past surgical history on file. Social History/Living Environment:   Home Environment: Assisted living  Current DME Used/Available at Home: Wheelchair  Prior Level of Function/Work/Activity:  Patient lives in assisted living facility, requires assist to transfer to wheelchair. Reports she uses a walker for transfers. Number of Personal Factors/Comorbidities that affect the Plan of Care: 3+: HIGH COMPLEXITY   EXAMINATION:   Most Recent Physical Functioning:   Gross Assessment:                  Posture:     Balance:  Sitting: Impaired  Sitting - Static: Fair (occasional); Good (unsupported)(with verbal cues)  Sitting - Dynamic: Poor (constant support)  Standing: Impaired  Standing - Static: Poor;Constant support(posteropr lean) Bed Mobility:  Rolling: Minimum assistance  Wheelchair Mobility:     Transfers:  Sit to Stand: Moderate assistance;Maximum assistance(bed elevation used to assist)  Stand to Sit: Moderate assistance  Gait:            Body Structures Involved:  1. Heart  2. Lungs  3. Muscles Body Functions Affected:  1. Mental  2. Cardio  3. Respiratory  4. Neuromusculoskeletal  5. Movement Related Activities and Participation Affected:  1. Learning and Applying Knowledge  2. General Tasks and Demands  3. Mobility  4. Self Care  5.  Domestic Life  6. Interpersonal Interactions and Relationships  7. Community, Social and [place]   Number of elements that affect the Plan of Care: 4+: HIGH COMPLEXITY   CLINICAL PRESENTATION:   Presentation: Evolving clinical presentation with changing clinical characteristics: MODERATE COMPLEXITY   CLINICAL DECISION MAKIN [facility] Mobility Inpatient Short Form  How much difficulty does the patient currently have. .. Unable A Lot A Little None   1. Turning over in bed (including adjusting bedclothes, sheets and blankets)? [] 1   [] 2   [x] 3   [] 4   2. Sitting down on and standing up from a chair with arms ( e.g., wheelchair, bedside commode, etc.)   [] 1   [x] 2   [] 3   [] 4   3. Moving from lying on back to sitting on the side of the bed? [] 1   [x] 2   [] 3   [] 4   How much help from another person does the patient currently need. .. Total A Lot A Little None   4. Moving to and from a bed to a chair (including a wheelchair)? [] 1   [x] 2   [] 3   [] 4   5. Need to walk in hospital room? [] 1   [x] 2   [] 3   [] 4   6. Climbing 3-5 steps with a railing? [x] 1   [] 2   [] 3   [] 4   © , Trustees of [address] [place] [address] [address] [address], under license to [employer]. All rights reserved      Score:  Initial: 12 Most Recent: X (Date: -- )    Interpretation of Tool:  Represents activities that are increasingly more difficult (i.e. Bed mobility, Transfers, Gait). Medical Necessity:     · Patient demonstrates   · good  ·  rehab potential due to higher previous functional level. Reason for Services/Other Comments:  · Patient   · continues to require modification of therapeutic interventions to increase complexity of exercises  · .    Use of outcome tool(s) and clinical judgement create a POC that gives a: Questionable prediction of patient's progress: MODERATE COMPLEXITY            TREATMENT:   (In addition to Assessment/Re-Assessment sessions the following treatments were rendered)   Pre-treatment Symptoms/Complaints:  Patient had no complaints of pain during therapy. Pain: Initial: 0/10     Post Session:  0/10     Therapeutic Activity ): Therapeutic activity included Rolling and bridging in bed, supine to sit, sitting and sit to stand/standing  to improve functional Mobility, Strength, ROM, and Activity tolerance. Braces/Orthotics/Lines/Etc:   · O2 Device: Room air  Treatment/Session Assessment:    · Response to Treatment:  Patient participated with therapy well today despite being anxious. She needs to know what you are going to do prior to doing it. · Interdisciplinary Collaboration:   o Physical Therapist  o Registered Nurse  · After treatment position/precautions:   o Supine in bed  o Bed/Chair-wheels locked  o Bed in low position  o Call light within reach  o RN notified   · Compliance with Program/Exercises: Will assess as treatment progresses  · Recommendations/Intent for next treatment session: \"Next visit will focus on advancements to more challenging activities and reduction in assistance provided\".   Total Treatment Duration:  PT Patient Time In/Time Out  Time In: 1030  Time Out: [address]

## 2020-12-08 PROBLEM — R53.1 WEAKNESS: Status: ACTIVE | Noted: 2020-12-08

## 2020-12-08 LAB
MM INDURATION POC: 0 MM (ref 0–5)
PPD POC: NEGATIVE NEGATIVE
SARS COV-2, XPGCVT: NEGATIVE
SOURCE, COVRS: NORMAL

## 2020-12-08 PROCEDURE — 74011250637 HC RX REV CODE- 250/637: Performed by: INTERNAL MEDICINE

## 2020-12-08 PROCEDURE — 97112 NEUROMUSCULAR REEDUCATION: CPT

## 2020-12-08 PROCEDURE — 99218 HC RM OBSERVATION: CPT

## 2020-12-08 PROCEDURE — 2709999900 HC NON-CHARGEABLE SUPPLY

## 2020-12-08 PROCEDURE — 65270000029 HC RM PRIVATE

## 2020-12-08 PROCEDURE — 74011250636 HC RX REV CODE- 250/636: Performed by: FAMILY MEDICINE

## 2020-12-08 PROCEDURE — 97165 OT EVAL LOW COMPLEX 30 MIN: CPT

## 2020-12-08 PROCEDURE — 97535 SELF CARE MNGMENT TRAINING: CPT

## 2020-12-08 PROCEDURE — 74011250637 HC RX REV CODE- 250/637: Performed by: FAMILY MEDICINE

## 2020-12-08 RX ADMIN — POLYETHYLENE GLYCOL 3350 17 G: 17 POWDER, FOR SOLUTION ORAL at 08:30

## 2020-12-08 RX ADMIN — RIVASTIGMINE TARTRATE 3 MG: 1.5 CAPSULE ORAL at 08:29

## 2020-12-08 RX ADMIN — MEMANTINE HYDROCHLORIDE 10 MG: 5 TABLET ORAL at 08:29

## 2020-12-08 RX ADMIN — CARBIDOPA AND LEVODOPA 1 TABLET: 25; 100 TABLET ORAL at 17:59

## 2020-12-08 RX ADMIN — BUMETANIDE 2 MG: 1 TABLET ORAL at 08:27

## 2020-12-08 RX ADMIN — Medication 10 ML: at 22:00

## 2020-12-08 RX ADMIN — CARBIDOPA AND LEVODOPA 1 TABLET: 25; 100 TABLET ORAL at 08:28

## 2020-12-08 RX ADMIN — CARBIDOPA AND LEVODOPA 1 TABLET: 25; 100 TABLET ORAL at 21:10

## 2020-12-08 RX ADMIN — Medication 10 ML: at 14:06

## 2020-12-08 RX ADMIN — ASPIRIN 81 MG: 81 TABLET ORAL at 08:27

## 2020-12-08 RX ADMIN — RIVASTIGMINE TARTRATE 3 MG: 1.5 CAPSULE ORAL at 17:59

## 2020-12-08 RX ADMIN — FAMOTIDINE 20 MG: 20 TABLET, FILM COATED ORAL at 08:28

## 2020-12-08 RX ADMIN — TRAMADOL HYDROCHLORIDE 50 MG: 50 TABLET, FILM COATED ORAL at 08:30

## 2020-12-08 RX ADMIN — OXYCODONE HYDROCHLORIDE AND ACETAMINOPHEN 1000 MG: 500 TABLET ORAL at 08:26

## 2020-12-08 RX ADMIN — COLCHICINE 0.6 MG: 0.6 TABLET, FILM COATED ORAL at 08:28

## 2020-12-08 RX ADMIN — ESCITALOPRAM OXALATE 10 MG: 10 TABLET ORAL at 08:28

## 2020-12-08 RX ADMIN — ENOXAPARIN SODIUM 40 MG: 40 INJECTION SUBCUTANEOUS at 08:30

## 2020-12-08 RX ADMIN — Medication 10 ML: at 06:12

## 2020-12-08 RX ADMIN — MEMANTINE HYDROCHLORIDE 10 MG: 5 TABLET ORAL at 21:11

## 2020-12-08 NOTE — PROGRESS NOTES
ACUTE OT GOALS:  (Developed with and agreed upon by patient and/or caregiver.)  1. Patient will complete lower body bathing and dressing with Mod A and adaptive equipment as needed. 2. Patient will complete toileting with Mod A and adaptive equipment as needed. 3. Patient will complete bed mobility with Min A and minimal verbal cueing. 4. Patient will complete functional transfers with Mod A and adaptive equipment as needed. 5. Patient will complete seated grooming ADL with Set Up and minimal verbal cues. Timeframe: 7 visits     OCCUPATIONAL THERAPY ASSESSMENT: Initial Assessment and Daily Note OT Treatment Day # 1    Yeny Yang is a 82 y.o. female   PRIMARY DIAGNOSIS: Suspected COVID-19 virus infection    Reason for Referral:  Generalized weakness  ICD-10: Treatment Diagnosis: Generalized Muscle Weakness (M62.81)  Difficulty in walking, Not elsewhere classified (R26.2)  INPATIENT: Payor: [employer]OB[employer] MEDICARE / Plan: Duda / Product Type: Managed Care Medicare /   ASSESSMENT:     REHAB RECOMMENDATIONS:   Recommendation to date pending progress:  Setting:   Short-term Rehab  Equipment:    To Be Determined     INITIAL/CURRENT LEVEL OF FUNCTION:  (Most Recently Demonstrated) PRIOR LEVEL OF FUNCTION:  (Prior to Hospitalization)   Bathing:   Moderate Assistance  Dressing:   Moderate Assistance  Feeding/Grooming:   Minimal Assistance  Toileting:   Total Assistance Bathing:   Unknown  Dressing:   Unknown  Feeding/Grooming:   Unknown  Toileting:   Unknown     ASSESSMENT:  Ms. Gregorio Riggs presents with decreased cognition, balance, and strength for performance of ADLs and functional mobility. Pt requires Max A for bed mobility and Max A x 2 with use of RW to complete functional transfers, resulting in increased assistance. Pt would benefit from skilled OT during her acute care stay to increase pt independence and safety for performance of ADLs and functional mobility.       SUBJECTIVE:   Ms. Andrey Scot states, \"I have a routine and everyone keeps coming in and messing with it\". In response to  working with therapy. SOCIAL HISTORY/LIVING ENVIRONMENT:   Home Environment: Assisted living [facility]). Pt has PMH of Dementia. OBJECTIVE:     PAIN: VITAL SIGNS: LINES/DRAINS:   Pre Treatment: Pain Screen  Pain Scale 1: Numeric (0 - 10)(at rest)  Pain Intensity 1: 0  Post Treatment: Unchanged. Pt does have increased pain in LLE with movement. Purewick  O2 Device: Room air     GROSS EVALUATION:  BUE Within Functional Limits Abnormal/ Functional Abnormal/ Non-Functional (see comments) Not Tested Comments:   AROM [] [x] [] []    PROM [x] [] [] []    Strength [] [x] [] []    Balance [] [x] [] [] Seated: Fair; Standing: Poor   Posture [] [x] [] []    Sensation [] [] [] [x]    Coordination [] [x] [] []    Tone [x] [] [] []    Edema [x] [] [] []    Activity Tolerance [] [x] [] []     [] [] [] []      COGNITION/  PERCEPTION: Intact Impaired   (see comments) Comments:   Orientation [] [x]    Vision [x] []    Hearing [x] []    Judgment/ Insight [] [x]    Attention [] [x]    Memory [] [x]    Command Following [] [x] Requires additional cueing.    Emotional Regulation [] [x] Pt presents slightly tearful with mobility.    [] []      ACTIVITIES OF DAILY LIVING: I Mod I S SBA CGA Min Mod Max Total NT Comments   BASIC ADLs:              Bathing/ Showering [] [] [] [] [] [] [x] [] [] []    Toileting [] [] [] [] [] [] [] [] [x] []    Dressing [] [] [] [] [] [] [x] [] [] []    Feeding [] [] [] [] [] [x] [] [] [] []    Grooming [] [] [] [] [] [x] [] [] [] []    Personal Device Care [] [] [] [] [] [] [] [] [] [x]    Functional Mobility [] [] [] [] [] [] [] [x] [] [] Max A x 2 transfers   INSTRUMENTAL ADLs:              Care of Others [] [] [] [] [] [] [] [] [x] []    Community Mobility [] [] [] [] [] [] [] [] [x] []    Financial Management [] [] [] [] [] [] [] [] [x] []    Home Management [] [] [] [] [] [] [] [] [x] []    Meal Preparation [] [] [] [] [] [] [] [] [x] []    Health Management [] [] [] [] [] [] [] [] [x] []    Work/Leisure [] [] [] [] [] [] [] [] [x] []    I=Independent, Mod I=Modified Independent, S=Supervision, SBA=Standby Assistance, CGA=Contact Guard Assistance,   Min=Minimal Assistance, Mod=Moderate Assistance, Max=Maximal Assistance, Total=Total Assistance, NT=Not Tested    MOBILITY: I Mod I S SBA CGA Min Mod Max Total  NT x2 Comments:   Supine to sit [] [] [] [] [] [] [] [x] [] [] []    Sit to supine [] [] [] [] [] [] [] [x] [] [] [x]    Sit to stand [] [] [] [] [] [] [] [x] [] [] [x]    Bed to chair [] [] [] [] [] [] [] [] [] [x] []    I=Independent, Mod I=Modified Independent, S=Supervision, SBA=Standby Assistance, CGA=Contact Guard Assistance,   Min=Minimal Assistance, Mod=Moderate Assistance, Max=Maximal Assistance, Total=Total Assistance, NT=Not [facility] [facility] Clicks   Daily Activity Inpatient Short Form        How much help from another person does the patient currently need. .. Total A Lot A Little None   1. Putting on and taking off regular lower body clothing? [x] 1   [] 2   [] 3   [] 4   2. Bathing (including washing, rinsing, drying)? [] 1   [x] 2   [] 3   [] 4   3. Toileting, which includes using toilet, bedpan or urinal?   [x] 1   [] 2   [] 3   [] 4   4. Putting on and taking off regular upper body clothing? [] 1   [x] 2   [] 3   [] 4   5. Taking care of personal grooming such as brushing teeth? [] 1   [] 2   [x] 3   [] 4   6. Eating meals? [] 1   [] 2   [x] 3   [] 4   © 2007, Trustees of [address] [place] [address] [address] [address], under license to [employer]. All rights reserved     Score:  Initial: 12, completed, 12/8/2020 Most Recent: X (Date: -- )   Interpretation of Tool:  Represents activities that are increasingly more difficult (i.e. Bed mobility, Transfers, Gait).     PLAN:   FREQUENCY/DURATION: OT Plan of Care: 3 times/week for duration of hospital stay or until stated goals are met, which ever comes first.    PROBLEM LIST:   (Skilled intervention is medically necessary to address:)  1. Decreased ADL/Functional Activities  2. Decreased Activity Tolerance  3. Decreased AROM/PROM  4. Decreased Balance  5. Decreased Cognition  6. Decreased Coordination  7. Decreased Gait Ability  8. Decreased Strength  9. Decreased Transfer Abilities  10. Increased Pain   INTERVENTIONS PLANNED:   (Benefits and precautions of occupational therapy have been discussed with the patient.)  1. Self Care Training  2. Therapeutic Activity  3. Therapeutic Exercise/HEP  4. Neuromuscular Re-education  5. Education     TREATMENT:     EVALUATION: Low Complexity : (Untimed Charge)    TREATMENT:     Self Care (8 Minutes): Self care including Toileting and Lower Body Dressing to increase independence and decrease level of assistance required. Pt requires Total A to complete toilet hygiene and don socks. Neuromuscular Re-education: (15 minutes):  Exercise/activities per grid below to improve balance, coordination and posture. Required minimal visual, verbal and tactile cues to promote static balance in sitting and static balance in standing. Pt has fair static and dynamic seated balance with use of propped sitting for increased support. Pt provided with minimal verbal, visual, and tactile cueing to place hands in lap to work on seated posture and balance. Pt requires Max A x 2 with use of RW to complete sit to stand. Upon standing, pt presents with poor static standing balance with posterior lean. Pt provided with maximal verbal, visual, and manual cueing to improve but is unable to improve at this time and requires Max A x 2 to complete stand to sit. At this time, patient is appropriate for Co-treatment with physical therapy due to patient's decreased overall endurance/tolerance levels, as well as need for high level skilled assistance to complete functional transfers/mobility and functional tasks.  Bart Fulton is appropriate for a multidisciplinary co-treatment of PT and OT to address goals of both disciplines.      AFTER TREATMENT POSITION/PRECAUTIONS:  Alarm Activated, Bed, Needs within reach and RN notified    INTERDISCIPLINARY COLLABORATION:  RN/PCT and OT/CHAMBERLAIN    TOTAL TREATMENT DURATION:  OT Patient Time In/Time Out  Time In: 1310  Time Out: [facility] ROMEL Krishna/L

## 2020-12-08 NOTE — PROGRESS NOTES
END OF SHIFT NOTE:    INTAKE/OUTPUT  12/06 0701 - 12/07 0700  In: 360 [P.O.:360]  Out: 500 [Urine:500]  Voiding: YES  Catheter: NO  Drain:   External Female Catheter 12/05/20 (Active)   Site Assessment Clean, dry, & intact 12/07/20 1633   Repositioned Yes 12/07/20 1633   Perineal Care Yes 12/07/20 1633   Wick Changed Yes 12/07/20 1633   Suction Canister/Tubing Changed Yes 12/07/20 1633   Urine Output (mL) 600 ml 12/07/20 1633               Flatus: Patient does have flatus present. Stool:  0 occurrences. Characteristics:       Emesis: 0 occurrences. Characteristics:        VITAL SIGNS  Patient Vitals for the past 12 hrs:   Temp Pulse Resp BP SpO2   12/07/20 1512 98.1 °F (36.7 °C) 82 17 118/70 91 %   12/07/20 1150 98.3 °F (36.8 °C) 77 16 133/78 93 %       Pain Assessment  Pain Intensity 1: 0 (12/06/20 0730)        Patient Stated Pain Goal: 0    Ambulating  Yes; with PT    Shift report given to oncoming nurse at the bedside.     Carlota Recinos, RN

## 2020-12-08 NOTE — PROGRESS NOTES
Bed accepted at [address] [place] [address] by daughter and Suzanna Torrez started on 12/8/20, covid test pending. CM will remain available for dc needs.

## 2020-12-08 NOTE — PROGRESS NOTES
END OF SHIFT NOTE:    INTAKE/OUTPUT  12/07 0701 - 12/08 0700  In: -   Out: 700 [Urine:700]  Voiding: YES  Catheter: NO  Drain:   External Female Catheter 12/05/20 (Active)   Site Assessment Clean, dry, & intact 12/07/20 2020   Repositioned Yes 12/07/20 2313   Perineal Care Yes 12/07/20 2313   Wick Changed Yes 12/07/20 2313   Suction Canister/Tubing Changed Yes 12/07/20 2313   Urine Output (mL) 100 ml 12/08/20 0458               Flatus: Patient does have flatus present. Stool:  0 occurrences. Characteristics:       Emesis: 0 occurrences. Characteristics:        VITAL SIGNS  Patient Vitals for the past 12 hrs:   Temp Pulse Resp BP SpO2   12/08/20 0454 97.8 °F (36.6 °C) 67 18  98 %   12/07/20 2303 98.9 °F (37.2 °C) 68 18 139/69 90 %   12/07/20 2052 98.2 °F (36.8 °C) 78 18 139/69 93 %       Pain Assessment  Pain Intensity 1: 0 (12/08/20 0539)        Patient Stated Pain Goal: 0    Ambulating  Yes    Shift report given to oncoming nurse at the bedside.     Roberta Stoner

## 2020-12-08 NOTE — PROGRESS NOTES
Hospitalist Note     Admit Date:  2020  4:54 PM   Name:  Yeimi Cheek   Age:  82 y.o.  :  1935   MRN:  [id number]   PCP:  Kenn Glasgow NP  Treatment Team: Attending Provider: Mark Nieto MD; Utilization Review: Scottie Kepm, ALIREZA; Utilization Review: Sil Shanks, ALIREZA; Care Manager: Chantel Scott RN; Occupational Therapist: Dayana Brunson     Ms. Janet Hendrickson is an 83 y/o WF with a h/o dementia, Parkinsonism, LE edema who presented to the ER from [facility] on  with SOB and leg swelling. She had mild hypoxemia and CXR showing mild vascular congestion. US neg for DVT in either LE. COVID swab is negative. She was continued on home Bumex. O2 weaned off. TTE showed normal EF. Plan is for STR tomorrow Wednesday. Subjective:   She was found in no distress. She was alert, cheerful. Denied any complains. Objective:     Patient Vitals for the past 24 hrs:   Temp Pulse Resp BP SpO2   20 1507 97.4 °F (36.3 °C) 69 19 136/67 94 %   20 1119 97.5 °F (36.4 °C) 67 18 132/74 93 %   20 0719 97.5 °F (36.4 °C) 63 18 (!) 145/72 91 %   20 0454 97.8 °F (36.6 °C) 67 18  98 %   20 2303 98.9 °F (37.2 °C) 68 18 139/69 90 %   20 2052 98.2 °F (36.8 °C) 78 18 139/69 93 %     Oxygen Therapy  O2 Sat (%): 94 % (20 1507)  Pulse via Oximetry: 70 beats per minute (20 0813)  O2 Device: Room air (20)  O2 Flow Rate (L/min): 1 l/min (20 0815)    There is no height or weight on file to calculate BMI. Intake/Output Summary (Last 24 hours) at 2020 1617  Last data filed at 2020 0959  Gross per 24 hour   Intake    Output 1300 ml   Net -1300 ml       *Note that automatically entered I/Os may not be accurate; dependent on patient compliance with collection and accurate  by techs. General:    Well nourished. No overt distress. Angry. CV:   RRR. No m/r/g. No edema. No JVD  Lungs:   CTAB. No wheezing, rhonchi, or rales. Unlabored  Abdomen:   Soft, nontender, nondistended. Extremities: Warm and dry. No cyanosis   Skin:     No rashes. No jaundice. Normal coloration  Neuro:  No gross focal deficits.   Alert    Data Reviewed:  I have reviewed all labs, meds, and studies from the last 24 hours:  Recent Results (from the past 24 hour(s))   PLEASE READ & DOCUMENT PPD TEST IN 24 HRS    Collection Time: 12/07/20  6:34 PM   Result Value Ref Range    PPD Negative Negative    mm Induration 0 0 - 0 mm       Current Meds:  Current Facility-Administered Medications   Medication Dose Route Frequency    LORazepam (ATIVAN) injection 1 mg  1 mg IntraVENous Q4H PRN    ascorbic acid (vitamin C) (VITAMIN C) tablet 1,000 mg  1,000 mg Oral DAILY    aspirin delayed-release tablet 81 mg  81 mg Oral DAILY    bumetanide (BUMEX) tablet 2 mg  2 mg Oral DAILY    carbidopa-levodopa (SINEMET)  mg per tablet 1 Tab  1 Tab Oral TID    colchicine tablet 0.6 mg  0.6 mg Oral DAILY    escitalopram oxalate (LEXAPRO) tablet 10 mg  10 mg Oral DAILY    guaiFENesin (ROBITUSSIN) 100 mg/5 mL oral liquid 200 mg  200 mg Oral TID PRN    memantine (NAMENDA) tablet 10 mg  10 mg Oral Q12H    rivastigmine tartrate (EXELON) capsule 3 mg  3 mg Oral BID WITH MEALS    traMADoL (ULTRAM) tablet 50 mg  50 mg Oral Q6H PRN    sodium chloride (NS) flush 5-40 mL  5-40 mL IntraVENous Q8H    sodium chloride (NS) flush 5-40 mL  5-40 mL IntraVENous PRN    acetaminophen (TYLENOL) tablet 650 mg  650 mg Oral Q6H PRN    Or    acetaminophen (TYLENOL) suppository 650 mg  650 mg Rectal Q6H PRN    polyethylene glycol (MIRALAX) packet 17 g  17 g Oral DAILY    promethazine (PHENERGAN) tablet 12.5 mg  12.5 mg Oral Q6H PRN    Or    ondansetron (ZOFRAN) injection 4 mg  4 mg IntraVENous Q6H PRN    enoxaparin (LOVENOX) injection 40 mg  40 mg SubCUTAneous DAILY    oxyCODONE IR (ROXICODONE) tablet 5 mg  5 mg Oral Q4H PRN    famotidine (PEPCID) tablet 20 mg  20 mg Oral DAILY Other Studies:  Results for orders placed or performed during the hospital encounter of 20   2D ECHO COMPLETE ADULT (TTE) W OR [address] [facility]  [address] [place] [address], [address] [facility]  [phone]    Transthoracic Echocardiogram  2D, M-mode, Doppler, and Color Doppler    Patient: Rocio Alaniz  MR #: [id number]  : 1935  Age: 82 years  Gender: Female  Study date: 06-Dec-2020  Account #: [de-identified]  Height: 63 in  Weight: 150 lb  BSA: 1.71 mï¾²  Status:Routine  Location: 219  BP: 141/ 64    Allergies: CORTISONE, MORPHINE, SULFA (SULFONAMIDE ANTIBIOTICS)    Sonographer:  [facility]in De [facility]  Group:  [facility]  Referring Physician:  Paulino Sargent MD  Reading Physician:  Jennifer Finch MD [facility]    INDICATIONS: Pulmonary edema    PROCEDURE: This was a routine study. A transthoracic echocardiogram was  performed. The study included complete 2D imaging, M-mode, complete spectral  Doppler, and color Doppler. Image quality was adequate. LEFT VENTRICLE: Size was normal. Systolic function was normal. Ejection  fraction was estimated in the range of 55 % to 60 %. There were no regional  wall motion abnormalities. Wall thickness was normal. Left ventricular  diastolic function parameters were normal. Avg E/e': 11.5. RIGHT VENTRICLE: The size was normal. Systolic function was normal. The  tricuspid jet envelope definition was inadequate for estimation of RV   systolic  pressure. LEFT ATRIUM: Size was normal.    RIGHT ATRIUM: Not well visualized. SYSTEMIC VEINS: IVC: The inferior vena cava was not well visualized. AORTIC VALVE: The valve was structurally normal, tri-commissural. There was   no  evidence for stenosis. There was no insufficiency. MITRAL VALVE: Valve structure was normal. There was no evidence for stenosis. There was trivial regurgitation.     TRICUSPID VALVE: The valve structure was normal. There was no evidence for  stenosis. There was trivial regurgitation. PULMONIC VALVE: Not well visualized. There was no evidence for stenosis. There  was no insufficiency. PERICARDIUM: There was no pericardial effusion. AORTA: The root exhibited normal size. SUMMARY:    -  Left ventricle: Systolic function was normal. Ejection fraction was  estimated in the range of 55 % to 60 %. There were no regional wall motion  abnormalities. SYSTEM MEASUREMENT TABLES    2D  Ao Diam: 3.2 cm  LA Diam: 3.1 cm  %FS: 41.3 %  IVSd: 1 cm  LVIDd: 4.2 cm  LVIDs: 2.4 cm  LVOT Diam: 1.8 cm  LVPWd: 1 cm    Prepared and signed by    Hussain Fuller MD [facility] [place]  Signed 06-Dec-2020 19:44:05         No results found. All Micro Results     None          SARS-CoV-2 Lab Results  \"Novel Coronavirus\" Test: No results found for: COV2NT   \"Emergent Disease\" Test: No results found for: EDPR  \"SARS-COV-2\" Test: No results found for: XGCOVT  Rapid Test: No results found for: COVR         Assessment and Plan:     Hospital Problems as of 12/8/2020 Never Reviewed          Codes Class Noted - Resolved POA    Weakness ICD-10-CM: R53.1  ICD-9-CM: 780.79  12/8/2020 - Present Unknown        * (Principal) Suspected COVID-19 virus infection (Chronic) ICD-10-CM: T66.165  ICD-9-CM: V01.79  12/3/2020 - Present Yes        DNR (do not resuscitate) ICD-10-CM: Z66  ICD-9-CM: V49.86  12/3/2020 - Present Yes        Dementia associated with other underlying disease without behavioral disturbance ([facility] [address] ICD-10-CM: F02.80  ICD-9-CM: 294.10  6/7/2019 - Present Yes        Parkinsonism ([facility] [address] (Chronic) ICD-10-CM: G20  ICD-9-CM: 332.0  6/7/2019 - Present Yes              Plan:  # Acute hypoxemic respiratory failure              - Resolved. COVID negative, on PO Bumex, now on RA. TTE normal.     # B/l LE edema              - TTE normal. US neg for DVT in either leg. Bumex.     # Dementia/Parkinsonism              - Home meds.     DC planning/Dispo: Medically stable for discharge, STR tomorrow    Diet:  DIET REGULAR  DVT ppx: Lovenox    PATIENTS STATUS UPGRADED FROM OBSERVATION TO INPATIENT. PMH, SOCIAL HX, FAMILY HX AND ROS UNCHANGED FROM HPI.      Signed:  Odessa Paz MD

## 2020-12-09 PROCEDURE — 74011250637 HC RX REV CODE- 250/637: Performed by: FAMILY MEDICINE

## 2020-12-09 PROCEDURE — 74011250637 HC RX REV CODE- 250/637: Performed by: INTERNAL MEDICINE

## 2020-12-09 PROCEDURE — 74011250636 HC RX REV CODE- 250/636: Performed by: FAMILY MEDICINE

## 2020-12-09 PROCEDURE — 65270000029 HC RM PRIVATE

## 2020-12-09 PROCEDURE — 97530 THERAPEUTIC ACTIVITIES: CPT

## 2020-12-09 RX ORDER — FUROSEMIDE 40 MG/1
40 TABLET ORAL DAILY
COMMUNITY

## 2020-12-09 RX ORDER — TRAMADOL HYDROCHLORIDE 50 MG/1
50 TABLET ORAL
Qty: 15 TAB | Refills: 0 | Status: SHIPPED | OUTPATIENT
Start: 2020-12-09 | End: 2020-12-14

## 2020-12-09 RX ORDER — LANOLIN ALCOHOL/MO/W.PET/CERES
400 CREAM (GRAM) TOPICAL DAILY
COMMUNITY

## 2020-12-09 RX ORDER — HYDROCHLOROTHIAZIDE 12.5 MG/1
12.5 TABLET ORAL DAILY
COMMUNITY

## 2020-12-09 RX ORDER — IBUPROFEN 200 MG
2 CAPSULE ORAL
COMMUNITY
End: 2020-12-10

## 2020-12-09 RX ORDER — ACETAMINOPHEN 500 MG
650 TABLET ORAL
Qty: 30 TAB | Refills: 0 | Status: SHIPPED
Start: 2020-12-09

## 2020-12-09 RX ADMIN — ENOXAPARIN SODIUM 40 MG: 40 INJECTION SUBCUTANEOUS at 08:58

## 2020-12-09 RX ADMIN — BUMETANIDE 2 MG: 1 TABLET ORAL at 08:57

## 2020-12-09 RX ADMIN — Medication 10 ML: at 14:42

## 2020-12-09 RX ADMIN — CARBIDOPA AND LEVODOPA 1 TABLET: 25; 100 TABLET ORAL at 21:23

## 2020-12-09 RX ADMIN — Medication 10 ML: at 21:23

## 2020-12-09 RX ADMIN — MEMANTINE HYDROCHLORIDE 10 MG: 5 TABLET ORAL at 08:57

## 2020-12-09 RX ADMIN — COLCHICINE 0.6 MG: 0.6 TABLET, FILM COATED ORAL at 08:58

## 2020-12-09 RX ADMIN — FAMOTIDINE 20 MG: 20 TABLET, FILM COATED ORAL at 08:58

## 2020-12-09 RX ADMIN — OXYCODONE HYDROCHLORIDE 5 MG: 5 TABLET ORAL at 11:23

## 2020-12-09 RX ADMIN — ASPIRIN 81 MG: 81 TABLET ORAL at 08:57

## 2020-12-09 RX ADMIN — Medication 10 ML: at 06:00

## 2020-12-09 RX ADMIN — RIVASTIGMINE TARTRATE 3 MG: 1.5 CAPSULE ORAL at 08:58

## 2020-12-09 RX ADMIN — TRAMADOL HYDROCHLORIDE 50 MG: 50 TABLET, FILM COATED ORAL at 22:05

## 2020-12-09 RX ADMIN — RIVASTIGMINE TARTRATE 3 MG: 1.5 CAPSULE ORAL at 17:11

## 2020-12-09 RX ADMIN — ESCITALOPRAM OXALATE 10 MG: 10 TABLET ORAL at 08:58

## 2020-12-09 RX ADMIN — POLYETHYLENE GLYCOL 3350 17 G: 17 POWDER, FOR SOLUTION ORAL at 08:58

## 2020-12-09 RX ADMIN — OXYCODONE HYDROCHLORIDE AND ACETAMINOPHEN 1000 MG: 500 TABLET ORAL at 08:57

## 2020-12-09 RX ADMIN — MEMANTINE HYDROCHLORIDE 10 MG: 5 TABLET ORAL at 21:23

## 2020-12-09 NOTE — PROGRESS NOTES
END OF SHIFT NOTE:    INTAKE/OUTPUT  12/07 0701 - 12/08 0700  In: -   Out: 700 [Urine:700]  Voiding: YES  Catheter: NO  Drain:              Flatus: Patient unsure of flatus present. Stool:  0 occurrences. Characteristics:       Emesis: 0 occurrences. Characteristics:        VITAL SIGNS  Patient Vitals for the past 12 hrs:   Temp Pulse Resp BP SpO2   12/08/20 1507 97.4 °F (36.3 °C) 69 19 136/67 94 %   12/08/20 1119 97.5 °F (36.4 °C) 67 18 132/74 93 %   12/08/20 0719 97.5 °F (36.4 °C) 63 18 (!) 145/72 91 %       Pain Assessment  Pain Intensity 1: 0 (12/08/20 1405)  Pain Location 1: Hip  Pain Intervention(s) 1: Medication (see MAR)  Patient Stated Pain Goal: 0    Ambulating  No    Shift report given to oncoming nurse at the bedside.     Killian Levy RN

## 2020-12-09 NOTE — PROGRESS NOTES
Updated PT/OT therapy notes requested by [employer], therapy notes faxed as requested [phone]

## 2020-12-09 NOTE — PROGRESS NOTES
Problem: Patient Education: Go to Patient Education Activity  Goal: Patient/Family Education  Outcome: Progressing Towards Goal     Problem: Falls - Risk of  Goal: *Absence of Falls  Description: Document Hermilo Apo Fall Risk and appropriate interventions in the flowsheet. Outcome: Progressing Towards Goal  Note: Fall Risk Interventions:  Mobility Interventions: Communicate number of staff needed for ambulation/transfer, Patient to call before getting OOB    Mentation Interventions: More frequent rounding, Room close to nurse's station, Door open when patient unattended    Medication Interventions: Patient to call before getting OOB, Teach patient to arise slowly    Elimination Interventions: Call light in reach, Patient to call for help with toileting needs              Problem: Patient Education: Go to Patient Education Activity  Goal: Patient/Family Education  Outcome: Progressing Towards Goal     Problem: Pressure Injury - Risk of  Goal: *Prevention of pressure injury  Description: Document Barrera Scale and appropriate interventions in the flowsheet.   Outcome: Progressing Towards Goal  Note: Pressure Injury Interventions:  Sensory Interventions: Assess changes in LOC, Keep linens dry and wrinkle-free    Moisture Interventions: Absorbent underpads, Check for incontinence Q2 hours and as needed    Activity Interventions: Increase time out of bed, Pressure redistribution bed/mattress(bed type)    Mobility Interventions: Pressure redistribution bed/mattress (bed type), HOB 30 degrees or less    Nutrition Interventions: Offer support with meals,snacks and hydration, Document food/fluid/supplement intake    Friction and Shear Interventions: Apply protective barrier, creams and emollients                Problem: Patient Education: Go to Patient Education Activity  Goal: Patient/Family Education  Outcome: Progressing Towards Goal     Problem: Patient Education: Go to Patient Education Activity  Goal: Patient/Family Education  Outcome: Progressing Towards Goal

## 2020-12-09 NOTE — PROGRESS NOTES
ACUTE PHYSICAL THERAPY GOALS:  (Developed with and agreed upon by patient and/or caregiver.)  Note: STG:  (1.)Ms. Carrie Williamson will move from supine to sit and sit to supine , scoot up and down, and roll side to side with MINIMAL ASSIST within 3 treatment day(s). (2.)Ms. Carrie Williamson will transfer from bed to chair and chair to bed with MODERATE ASSIST using the least restrictive device within 3 treatment day(s). (3.)Ms. Carrie Williamson will ambulate with MODERATE ASSIST for 2 feet with the least restrictive device within 3 treatment day(s). (4.)Ms. Carrie Williamson will perform seated static and dynamic balance activities x 15 minutes with STAND BY ASSIST to improve safety within 3 day(s). (5.)Ms. Carrie Williamson will perform standing static and dynamic balance activities x 5 minutes with MODERATE ASSIST to improve safety within 3 day(s). (6.)Ms. Carrie Williamson will maintain stable vital signs throughout all functional mobility within 3 days.     LTG:  (1.)Ms. Carrie Williamson will move from supine to sit and sit to supine , scoot up and down, and roll side to side in bed with CONTACT GUARD ASSIST within 7 treatment day(s). (2.)Ms. Carrie Williamson will transfer from bed to chair and chair to bed with CONTACT GUARD ASSIST using the least restrictive device within 7 treatment day(s). (3.)Ms. Carrie Williamson will ambulate with CONTACT GUARD ASSIST for 5 feet with the least restrictive device within 7 treatment day(s). (4.)Ms. Carrie Williamson will perform seated static and dynamic balance activities x 15 minutes with SUPERVISION to improve safety within 7 day(s). (5.)Ms. Carrie Williamson will perform standing static and dynamic balance activities x 5 minutes with CONTACT GUARD ASSIST to improve safety within 7 day(s). (6.)Ms. Carrie Williamson will ambulate and/or perform functional activities for 20 consecutive minutes with stable vital signs and no rests required to improve activity tolerance within 7 treatment days.  ________________________________________________________________________________________________      PHYSICAL THERAPY: Daily Note and PM Treatment Day # 3    Saleem Meehan is a 82 y.o. female   PRIMARY DIAGNOSIS: Suspected COVID-19 virus infection         ASSESSMENT:     REHAB RECOMMENDATIONS: CURRENT LEVEL OF FUNCTION:  (Most Recently Demonstrated)   Recommendation to date pending progress:  Setting:   Short-term Rehab  Equipment:    To Be Determined Bed Mobility:   Maximal Assistance  Sit to Stand:   Not tested  Transfers:   Maximal Assistance  Gait/Mobility:   Not tested     ASSESSMENT:  Ms. David Conroy was confused. Only response with all questions suggestions and options was \"I don't know, I don't know what do do, what do you want me to do\"--\"I don't want to do that. \"       SUBJECTIVE:   Ms. David Conroy states, \"I dont know, I can't, I don't know what I cant do\".     SOCIAL HISTORY/ LIVING ENVIRONMENT:   Home Environment: Assisted living  One/Two Story Residence: One story  Living Alone: No  Support Systems: Assisted living, Child(geovany)  OBJECTIVE:     PAIN: VITAL SIGNS: LINES/DRAINS:   Pre Treatment: Pain Screen  Pain Scale 1: Numeric (0 - 10)  Pain Intensity 1: 0  Post Treatment: 0   none  O2 Device: Room air     MOBILITY: I Mod I S SBA CGA Min Mod Max Total  NT x2 Comments:   Bed Mobility    Rolling [] [] [] [] [] [] [] [x] [] [] []    Supine to Sit [] [] [] [] [] [] [] [x] [] [] []    Scooting [] [] [] [] [] [] [] [x] [] [] []    Sit to Supine [] [] [] [] [] [] [] [x] [] [] []    Transfers    Sit to Stand [] [] [] [] [] [] [] [] [] [x] [] refused   Bed to Chair [] [] [] [] [] [] [] [] [] [x] [] refused   Stand to Sit [] [] [] [] [] [] [] [] [] [x] [] refused   I=Independent, Mod I=Modified Independent, S=Supervision, SBA=Standby Assistance, CGA=Contact Guard Assistance,   Min=Minimal Assistance, Mod=Moderate Assistance, Max=Maximal Assistance, Total=Total Assistance, NT=Not Tested    GAIT: I Mod I S SBA CGA Min Mod Max Total  NT Comments:   Level of Assistance [] [] [] [] [] [] [] [] [] [x]    Distance      DME N/A    Gait Quality      I=Independent, Mod I=Modified Independent, S=Supervision, SBA=Standby Assistance, CGA=Contact Guard Assistance,   Min=Minimal Assistance, Mod=Moderate Assistance, Max=Maximal Assistance, Total=Total Assistance, NT=Not Tested    PLAN:   FREQUENCY/DURATION: PT Plan of Care: 3 times/week for duration of hospital stay or until stated goals are met, which ever comes first.  TREATMENT:     TREATMENT:   ($$ Therapeutic Activity: 38-52 mins    )  Therapeutic Activity (38 Minutes): Therapeutic activity included Rolling, Supine to Sit, Sit to Supine, Scooting, Transfer Training and Sitting balance  to improve functional Mobility, Strength, ROM and Activity tolerance.     AFTER TREATMENT POSITION/PRECAUTIONS:  Alarm Activated and Bed    INTERDISCIPLINARY COLLABORATION:  PT/PTA    TOTAL TREATMENT DURATION:  PT Patient Time In/Time Out  Time In: 1510  Time Out: Vy Elias DPT

## 2020-12-09 NOTE — ADT AUTH CERT NOTES
Viral Illness, Acute - Care Day  (12/9/2020) by Kuldeep Ortiz RN  
 
   
Review Status  Review Entered Completed  12/9/2020 16:22   
   
Criteria Review Care Day: 7 Care Date: 12/9/2020 Level of Care: Inpatient Floor Guideline Day 3 Level Of Care (X) Floor to discharge Clinical Status   
(X) * Hemodynamic stability 12/9/2020 16:22:18 EST by Negin Loges   
  Vital Signs:  142/73, 98.4, 75, 16, 96%RA   
(X) * Afebrile or temperature acceptable for next level of care 12/9/2020 16:22:18 EST by Negin Loges   
  Acceptable   
(X) * Tachypnea absent 12/9/2020 16:22:18 EST by Negin Loges   
  Absent   
(X) * Hypoxemia absent 12/9/2020 16:22:18 EST by Negin Loges   
  Absent   
(X) * Mental status at baseline 12/9/2020 16:22:18 EST by Negin Loges   
  Baseline   
(X) * Renal function at baseline, or stable and acceptable for next level of care 12/9/2020 16:22:18 EST by Negin Loges   
  Baseline ( ) * Discharge plans and education understood 12/9/2020 16:22:18 EST by Carloz Ortiz, [address] for [place] to [address] [place] [address] Activity ( ) * Ambulatory or acceptable for next level of care Routes ( ) * Oral hydration [L]   
(X) * Oral medications or regimen acceptable for next level of care ( ) * Oral diet or acceptable for next level of care Interventions ( ) * Isolation not indicated, or is performable at next level of care [G] [H] Medications ( ) * Antimicrobial medication absent or regimen established for next level of care * Milestone Additional Notes Clinical Date Reviewed:   12/9/20 LOS; Status; Review Type:  Med/INpt/CS Abn. Findings LABS/RADIOLOGY:   
None Meds:   
Vitamin C 1000mg PO Daily ASA 81mg PO Daily Bumex 2mg PO Daily Sinemet 25-100mg PO TID Colchicine 0.6mg PO Daily Lovenox 40mg SC Daily Lexapro 10mg PO Daily Pepcid 20mg PO daily Namenda 10mg PO Q12hrs Roxicodone 5mg PO Q4hrs PRN X1 Miralax 17g PO Daily Exelon 3mg PO BID Internal Medicine Admit Date: 12/2/2020   
    
Discharge Date: 12/9/2020     
    
Admission Diagnoses: Suspected COVID-19 virus infection [Z20.828] Weakness [R53.1]   Discharge Condition: [facility] Course: Ms. Jackie Arnold is an 81 y/o WF with a h/o dementia, Parkinsonism, LE edema who presented to the ER from [facility] on 12/2 with SOB and leg swelling. She had mild hypoxemia and CXR showing mild vascular congestion. US neg for DVT in either LE. COVID swab was negative. She was continued on home Bumex. O2 weaned off. TTE showed normal EF. Her likely edema was multifactorial, poor ambulation, venous stasis. She remained clinically stable; and will be transferred to [facility] to continue her care.   
    
Physical exam:   
General:          Well nourished.  No overt distress. Lungs:             CTAB.  No wheezing, rhonchi, or rales.  Unlabored Abdomen:        Soft, nontender, nondistended.     
Extremities:     Warm and dry.  No cyanosis Skin:                No rashes.  No jaundice.  Normal coloration Neuro:             No gross focal deficits.  Alert   
    
Consults: None   
    
Significant Diagnostic Studies: see note   
    
Disposition: [facility]   
  
   
Viral Illness, Acute - Care Day  (12/8/2020) by Monica Ashby RN  
 
   
Review Status  Review Entered Completed  12/8/2020 15:18   
   
Criteria Review Care Day: 6 Care Date: 12/8/2020 Level of Care: Inpatient Floor Guideline Day 3 Level Of Care (X) Floor to discharge 12/8/2020 15:18:48 EST by Jessica keen Clinical Status ( ) * Hemodynamic stability ( ) * Afebrile or temperature acceptable for next level of care ( ) * Tachypnea absent   
( ) * Hypoxemia absent   
( ) * Mental status at baseline ( ) * Renal function at baseline, or stable and acceptable for next level of care ( ) * Discharge plans and education understood Activity ( ) * Ambulatory or acceptable for next level of care Routes ( ) * Oral hydration [L]   
(X) * Oral medications or regimen acceptable for next level of care 12/8/2020 15:18:48 EST by Watson Hurt   
  Diet regular ( ) * Oral diet or acceptable for next level of care Interventions ( ) * Isolation not indicated, or is performable at next level of care [G] [H] Medications ( ) * Antimicrobial medication absent or regimen established for next level of care * Milestone Additional Notes No MD progress note for 12/28/20 yet-   
Use physical therapy-PRIMARY DIAGNOSIS: Suspected COVID-19 virus infection Reason for Referral:  Generalized weakness ICD-10: Treatment Diagnosis: Generalized Muscle Weakness (M62.81) Difficulty in walking, Not elsewhere classified (R26.2) INPATIENT: Payor: Darlyn Shoemaker / Plan: BSI   
  
TREATMENT:   
    
EVALUATION: Low Complexity : (Untimed Charge)   
    
TREATMENT:   
    
Self Care (8 Minutes): Self care including Toileting and Lower Body Dressing to increase independence and decrease level of assistance required. Pt requires Total A to complete toilet hygiene and don socks.   
    
Neuromuscular Re-education: (15 minutes):  Exercise/activities per grid below to improve balance, coordination and posture.  Required minimal visual, verbal and tactile cues to promote static balance in sitting and static balance in standing.  Pt has fair static and dynamic seated balance with use of propped sitting for increased support. Pt provided with minimal verbal, visual, and tactile cueing to place hands in lap to work on seated posture and balance. Pt requires Max A x 2 with use of RW to complete sit to stand. Upon standing, pt presents with poor static standing balance with posterior lean.  Pt provided with maximal verbal, visual, and manual cueing to improve but is unable to improve at this time and requires Max A x 2 to complete stand to sit.   
    
At this time, patient is appropriate for Co-treatment with physical therapy due to patient's decreased overall endurance/tolerance levels, as well as need for high level skilled assistance to complete functional transfers/mobility and functional tasks. lAex Short is appropriate for a multidisciplinary co-treatment of PT and OT to address goals of both disciplines.   
    
V.S.   
97.5, 67, 18, 132/74,   
93% RA 1L/min Medications,   
ascorbic acid (vitamin C) (VITAMIN C) tablet 1,000 mg     
Dose: 1,000 mg Freq: DAILY Route: PO   
Start: 20 09 Order ID: [id number]   
  
aspirin delayed-release tablet 81 mg     
Dose: 81 mg   
Freq: DAILY Route: PO   
Start: 20 0900   
  
bumetanide (BUMEX) tablet 2 mg     
Dose: 2 mg Freq: DAILY Route: PO   
Start: 20 0900   
  
colchicine tablet 0.6 mg     
Dose: 0.6 mg   
Freq: DAILY Route: PO   
Start: 20 0900   
  
enoxaparin (LOVENOX) injection 40 mg     
Dose: 40 mg   
Freq: DAILY Route: SC   
Start: 20 09   
   
Letter of Determination by Monica Ashby RN  
 
   
Review Status  Review Entered In Primary  2020 12:16   
   
Criteria Review   
obs list upgrade 
  
  
We recommend that the following pt's hospitalization under OBSERVATION [104] 
status   is upgraded to INPATIENT If you agree, please place a new ADMIT order 
in [facility]  as recommended. 
  
  
Name:          Alex Short :            [address] [facility]# :         [id number] Insurance:   [employer]  
  
Clinical summary       patient with acute hypoxic respiratory failure Vitals                           hypoxia resolving Labs and Imaging       labs pending for today, hypokalemic on labs yesterday MCG criteria applies   YES Comments       patient with acute hypoxic respiratory failure, unclear etiology but likely secondary to CHF exacerbation, patient was on IV diuretics and oxygen 
support, switch to oral diuretics, oxygenation improved, now not requiring 
supplemental support, needing medical optimization 
  
  
This chart was reviewed at 7:59 AM 12/7/2020 
  
Dennis Cerrato MD MD  
Physician Advisor [employer] [facility] [phone] 
________________________________________________________________________________ 
_______ 
  
Commercial & Medicare Advantage Plan : The final decision of the patient's 
hospitalization status depends on the attending physician's judgment.  
  
  
  
  
  
  
The information in this document is a recommendation to be used for utilization 
review and utilization management purposes only. This recommendation is not an 
order. The recommendation is made based on the information reviewed at the time 
of the referral, is pursuant to the [facility] Conditions of 
Participation (42 CFR Part 482), and is neither a judgment nor an assessment 
with regard to the appropriateness or quality of clinical care. Nothing in this 
document may be used to limit, alter, or affect clinical services provided to 
the patient named below. The provider of services is ultimately responsible for 
the submission of a claim that has met all requirements for correct coding, 
billing, and reimbursement. 
   
   
Viral Illness, Acute - Care Day (12/7/2020) by Odalis Garland RN  
 
   
Review Status  Review Entered Completed  12/7/2020 12:59   
   
Criteria Review Care Day: 5 Care Date: 12/7/2020 Level of Care: Inpatient Floor Guideline Day 3 Level Of Care (X) Floor to discharge 12/7/2020 12:59:45 EST by Amado Monet   
  floor Clinical Status ( ) * Hemodynamic stability ( ) * Afebrile or temperature acceptable for next level of care ( ) * Tachypnea absent   
( ) * Hypoxemia absent   
( ) * Mental status at baseline ( ) * Renal function at baseline, or stable and acceptable for next level of care ( ) * Discharge plans and education understood Activity ( ) * Ambulatory or acceptable for next level of care Routes ( ) * Oral hydration [L]   
(X) * Oral medications or regimen acceptable for next level of care 12/7/2020 12:59:45 EST by Alejandra Jimenez   
  diet regular ( ) * Oral diet or acceptable for next level of care Interventions ( ) * Isolation not indicated, or is performable at next level of care [G] [H] Medications ( ) * Antimicrobial medication absent or regimen established for next level of care * Milestone Additional Notes HPI/Subjective:   
Ms. Combs July is an 81 y/o WF with a h/o dementia, Parkinsonism, LE edema who presented to the ER from [facility] on 12/2 with SOB and leg swelling. She had mild hypoxemia and CXR showing mild vascular congestion. US neg for DVT in either LE. COVID swab is negative. She was continued on home Bumex. O2 weaned off. TTE pending.   
    
12/7: Very angry this morning. Doesn't appear to be in any distress otherwise. Won't answer my questions. Temp Pulse Resp BP SpO2   
12/07/20 0741 98.6 °F (37 °C) 67 16 122/63 91 % 12/07/20 0402 99 °F (37.2 °C) 65 16 (!) 141/61 96 % Oxygen Therapy O2 Sat (%): 91 % (12/07/20 0741) Pulse via Oximetry: 70 beats per minute (12/04/20 0813) O2 Device: Room air (12/06/20 2106) O2 Flow Rate (L/min): 1 l/min (12/05/20 0815)   
 General:          Well nourished.  No overt distress. Angry. CV:                  RRR.  No m/r/g.  No edema.  No JVD Lungs:             CTAB.  No wheezing, rhonchi, or rales.  Unlabored Abdomen:        Soft, nontender, nondistended.     
Extremities:     Warm and dry.  No cyanosis Skin:                No rashes.  No jaundice.  Normal coloration Neuro:             No gross focal deficits.  Alert Medications,   
 potassium bicarb-citric acid (EFFER-K) tablet 20 mEq     
Dose: 20 mEq Freq: 2 TIMES DAILY Route: PO   
Start: 12/05/20 1700 End: 12/07/20 6108 rivastigmine tartrate (EXELON) capsule 3 mg     
Dose: 3 mg Freq: 2 TIMES DAILY WITH MEALS Route: PO   
Start: 12/03/20 0800   
  
memantine (NAMENDA) tablet 10 mg     
Dose: 10 mg   
Freq: EVERY 12 HOURS Route: PO   
Start: 12/03/20 0900   
  
enoxaparin (LOVENOX) injection 40 mg     
Dose: 40 mg   
Freq: DAILY Route: SC   
Start: 12/03/20 0900   
 Admin Instructions:   
  
carbidopa-levodopa (SINEMET)  mg per tablet 1 Tab     
Dose: 1 Tab Freq: 3 TIMES DAILY Route: PO   
Start: 12/03/20 0900   
  
bumetanide (BUMEX) tablet 2 mg     
Dose: 2 mg Freq: DAILY Route: PO   
Start: 12/03/20 0900   
  
aspirin delayed-release tablet 81 mg     
Dose: 81 mg   
Freq: DAILY Route: PO   
Start: 12/03/20 0900   
  
ascorbic acid (vitamin C) (VITAMIN C) tablet 1,000 mg     
Dose: 1,000 mg Freq: DAILY Route: PO   
Start: 12/03/20 0900   
  
    
    
Plan: # Acute hypoxemic respiratory failure   
            - Resolved. COVID negative, on PO Bumex, now on RA. TTE normal.   
    
# B/l LE edema   
            - TTE pending. US neg for DVT in either leg. Bumex.   
    
# Dementia/Parkinsonism   
            - Home meds.   
    
DC planning/Dispo: Medically stable for discharge but needs SNF. CM assisting. Diet:  DIET REGULAR   
DVT ppx: Lovenox   
   
Viral Illness, Acute - Care Day  (12/5/2020) by Tommie Sams RN  
 
   
Review Status  Review Entered Completed  12/6/2020 15:29   
   
Criteria Review Care Day: 3 Care Date: 12/5/2020 Level of Care: Inpatient Floor Guideline Day 2 Level Of Care (X) Floor Clinical Status   
(X) * Hypotension absent 12/6/2020 15:29:02 EST by Mark Jessica   
  /59   
(X) * No requirement for mechanical ventilation   
(X) * Oxygenation at baseline or improved 12/6/2020 15:29:02 EST by Nguyễn Ken RA   
(X) * Mental status at baseline 12/6/2020 15:29:02 EST by Irlanda Gandhi   
(X) * Oral hydration ( ) Oral or IV medications 12/6/2020 15:29:02 EST by Nguyễn WELCH   
(X) Usual diet 12/6/2020 15:29:02 EST by Nguyễn Ken   
  REG DIET Interventions ( ) Possible oxygen 12/6/2020 15:29:02 EST by Nguyễn Ken RA   
* Milestone Additional Notes 12/6: In bed, confused, unsure how she feels compared to admission. On RA O2 now. General:          Well nourished.  No overt distress. Mildly confused. CV:                  RRR.  No m/r/g.  No edema.  No JVD Lungs:             CTAB.  No wheezing, rhonchi, or rales.  Unlabored Abdomen:        Soft, nontender, nondistended.     
Extremities:     Warm and dry.  No cyanosis. Skin:                No rashes.  No jaundice.  Normal coloration Neuro:             No gross focal deficits.  Alert   
    
Plan: # Acute hypoxemic respiratory failure   
            - Resolved. COVID negative, on PO Bumex, now on RA. TTE pending.   
    
# B/l LE edema   
            - TTE pending. US neg for DVT in either leg. Bumex.   
    
# Dementia/Parkinsonism   
            - Home meds.   
    
DC planning/Dispo: Back to facility today vs tomorrow. Needs another repeat COVID within 72 hours per   
  
  
  
bp 130/59   
temp 98.2 HR 67   
RR 16   
O2 90 ON RA   
  
CO2 33, K 2.8, CREAT 1.07   
  
ECHO RESULT PENDING   
  
BEDREST, HOME HEALTH CONSULT, REG DIET   
  
no iv meds   
  
  
  
   
COVID RESULT NEGATIVE by Yobany Ramirez RN  
 
   
Review Status  Review Entered In Primary  12/5/2020 14:51   
   
   
Viral Illness, Acute - Care Day 2 (12/4/2020) by Héctor Don RN  
 
   
Review Status  Review Entered Completed  12/4/2020 15:13   
   
Criteria Review Care Day: 2 Care Date: 12/4/2020 Level of Care: Inpatient Floor Guideline Day 2 Level Of Care (X) Floor 12/4/2020 15:13:50 EST by Cem Presume   
  floor Clinical Status ( ) * Hypotension absent   
( ) * No requirement for mechanical ventilation ( ) * Oxygenation at baseline or improved ( ) * Mental status at baseline Routes ( ) * Oral hydration * Milestone Additional Notes No MD progress note patient admitted with possible COVID 19 Results for Carine Marks (MRN [id number]) as of 12/4/2020 15:14   
  
12/4/2020 09:47 WBC: 12.0 (H) RBC: 4.61 HGB: 14.3 HCT: 43.7 Results for Carine Marks (MRN [id number]) as of 12/4/2020 15:14   
  
12/4/2020 09:47 Sodium: 141 Potassium: 3.2 (L) Chloride: 102 CO2: 33 (H) Anion gap: 6 (L) Glucose: 142 (H) BUN: 14   
Creatinine: 1.19 (H) Calcium: 9.4 Magnesium: 2.2 GFR est non-AA: 46 (L)   
GFR est AA: 56 (L)   
  
V.S.   
98.3, 72, 22, 138/82, Medications,   
ascorbic acid (vitamin C) (VITAMIN C) tablet 1,000 mg     
Dose: 1,000 mg Freq: DAILY Route: PO   
Start: 12/03/20 0900   
  
aspirin delayed-release tablet 81 mg     
Dose: 81 mg   
Freq: DAILY Route: PO   
Start: 12/03/20 0900   
  
carbidopa-levodopa (SINEMET)  mg per tablet 1 Tab     
Dose: 1 Tab Freq: 3 TIMES DAILY Route: PO   
Start: 12/03/20 0900   
  
bumetanide (BUMEX) tablet 2 mg     
Dose: 2 mg Freq: DAILY Route: PO   
Start: 12/03/20 0900 Order ID: [id number]   
  
enoxaparin (LOVENOX) injection 40 mg     
Dose: 40 mg   
Freq: DAILY Route: SC   
Start: 12/03/20 0900   
   
COVID 19 results by Irene Guevara RN  
 
   
Review Status  Review Entered In Primary  12/4/2020 07:56   
   
Criteria Review Is the illness suspected to be related to the Coronavirus (COVID-19)? Yes Yes, No or Other Has the member been tested for the COVID-19? Yes Yes or No 
If Yes, what are the results of the COVID-19? Negative                   Positive, Negative or Pending 
  
 Specimen source  Nasopharyngeal         Final   
COVID-19 rapid test  PENDING        Incomplete SARS CoV-2  Negative    Negative     Final   
Comment:   
(NOTE) This test was developed and its performance characteristics  
determined by [facility]. It has not been cleared or approved  
by the FDA. However, such approval/clearance is not required, as the  
laboratory is regulated and qualified under CLIA to perform  
high-complexity testing. This test is used for clinical purposes, and  
should not be regarded as investigational or for research. This test  
has been validated in accordance with the FDA's Guidance Document 'Policy for [address] in Laboratories Certified to Perform High Complexity Testing under CLIA prior to Emergency Use Authorization for Coronavirus [id number] during the [facility] Emergency' issued on February 29th, 2020. FDA independent review of  
this validation is pending. This test is only authorized for the  
duration of time the declaration that circumstances exist justifying  
the authorization of the emergency use of in vitro diagnostic tests  
for detection of SARS-CoV-2 virus and/or diagnosis of COVID-19  
infection under section 564(b)(1) of the Act, 21 U. S.C.  
615FDG-1(W)(0), unless the authorization is terminated or revoked  
sooner.   
   
   
Viral Illness, Acute - Care Day  (12/3/2020) by Scottie Kemp RN  
 
   
Review Status  Review Entered Completed  12/4/2020 07:49   
   
Criteria Review Care Day: 1 Care Date: 12/3/2020 Level of Care: Inpatient Floor Guideline Day 1 Level Of Care (X) ICU [J] or floor 12/4/2020 07:49:34 EST by Watson Hurt   
  floor Clinical Status ( ) * Clinical Indications met [K]   
12/4/2020 07:49:34 EST by Watson Hurt   
  83 y/o admitted with suspected COVID 19 infection Routes   
(X) Oral or IV hydration 12/4/2020 07:49:34 EST by Watson Hurt   
  NS IVF * Milestone Additional Notes Patient is 82-year-old female, oriented to person only during my exam.  Patient complaining of left leg pain.  Per EMS, patient was sent to the emergency department for evaluation of increased swelling to bilateral lower extremities.   
    
I spoke to patient's assisted living who states she has a history of dementia, has had decreased appetite over the last few days, complaining of lower extremity pain without a history of a fall.  She was sent to the emergency department for evaluation. Manda Hua are unsure patient had a fever at the facility.  Denies nausea, vomiting, diarrhea, cough or congestion. Patient is a 82 y.o. female who presents to the ER from an assisted living facility.  EMS reported she was sent to the emergency room for leg swelling and fever.  She complained to the ER of left hip and leg pain.  She has been afebrile in the ER. Giovani Spivey has had mild hypoxia however.  Her facility reports having multiple Covid positive patients.  Her facility also reported that she has had a decreased appetite for the last few days and has been complaining of leg pain, but no history of fall. Jessi Comment is no reported history of NVD, cough, shortness of breath, congestion, chest pain. Patient tells me she is here because her legs are swelling and painful, more so on the left.  She reports in the past that she used to take Lasix, but this was weaned off and discontinued.  She denies any other significant problems.  She denies fever/chills, NVD, chest pain, abdominal pain, shortness of breath, cough, headaches.  She did take off her oxygen during my exam with her, and her O2 saturation did drop slowly back down to the high 80s on room air and then I asked her to place her oxygen back on. Oxygen Therapy O2 Sat (%): 96 % (12/03/20 0009) Pulse via Oximetry: 77 beats per minute (12/03/20 0009) O2 Device: Nasal cannula (12/02/20 2135) O2 Flow Rate (L/min): 3 l/min (12/02/20 2135)  There is no height or weight on file to calculate BMI. Patient Vitals for the past 24 hrs:   
  Temp Pulse Resp BP SpO2   
12/03/20 0009 - 77 - - 96 % 12/03/20 0006 - 87 - (!) 149/68 91 % Chest x-ray- IMPRESSION IMPRESSION:   
    
Suspect mild interstitial edema.   
    
XR hip-   
iIMPRESSION IMPRESSION:   
1. No acute abnormality. Mild left hip arthrosis.   
 heather Exam   
  
  
ED exam   
Vitals signs and nursing note reviewed. Constitutional:     
   Appearance: Normal appearance. She is not ill-appearing or toxic-appearing. HENT:   
   Head: Normocephalic and atraumatic.   
   Nose: Nose normal.   
   Mouth/Throat:   
   Mouth: Mucous membranes are moist.   
Eyes:   
   Extraocular Movements: Extraocular movements intact. Neck:   
   Musculoskeletal: Normal range of motion. No neck rigidity. Cardiovascular:   
   Rate and Rhythm: Normal rate and regular rhythm.   
   Pulses: Normal pulses.   
   Heart sounds: Normal heart sounds. Pulmonary:   
   Effort: Pulmonary effort is normal. No respiratory distress.   
   Breath sounds: Normal breath sounds. Abdominal:   
   General: Abdomen is flat. There is no distension.   
   Palpations: Abdomen is soft.   
   Tenderness: There is no abdominal tenderness. Musculoskeletal: Normal range of motion.   
   Comments: Exquisite tenderness to superficial palpation of the entire left lower extremity.  Patient will actively move the entire left lower extremity with complaint of severe pain.  Neurovascular intact distally. Skin:   
   General: Skin is warm and dry. Neurological:   
   General: No focal deficit present.   
   Mental Status: She is alert and oriented to person, place, and time. Psychiatric:       
   Mood and Affect: Mood normal.   
  
Duplex lower extremity -IMPRESSION IMPRESSION: No sonographic evidence for deep venous thrombosis in either lower   
extremity.   
    
    
Medications,   
 ascorbic acid (vitamin C) (VITAMIN C) tablet 1,000 mg     
Dose: 1,000 mg Freq: DAILY Route: PO   
Start: 12/03/20 0900   
  
aspirin delayed-release tablet 81 mg     
Dose: 81 mg   
Freq: DAILY Route: PO   
Start: 12/03/20 0900   
  
bumetanide (BUMEX) tablet 2 mg     
Dose: 2 mg Freq: DAILY Route: PO   
Start: 12/03/20 0900   
  
carbidopa-levodopa (SINEMET)  mg per tablet 1 Tab     
Dose: 1 Tab Freq: 3 TIMES DAILY Route: PO   
Start: 12/03/20 0900   
  
potassium chloride 20 mEq in 100 ml IVPB     
Dose: 20 mEq Freq: EVERY 2 HOURS Route: IV Start: 12/03/20 0900 End: 12/03/20 1300   
  
0.9% sodium chloride infusion     
Rate: 75 mL/hr Dose: 75 mL/hr Freq: CONTINUOUS Route: IV Start: 12/03/20 0300 End: 12/03/20 0452   
  
famotidine (PEPCID) tablet 20 mg     
Dose: 20 mg   
Freq: 2 TIMES DAILY Route: PO   
Start: 12/03/20 0900 End: 12/03/20 1357   
 Order specific questions:   
  
  
  
  
   
Viral Illness, Acute - Clinical Indications for Admission to Inpatient Care by Mar Lou RN  
 
   
Review Status  Review Entered Completed  12/4/2020 07:48   
   
Criteria Review Clinical Indications for Admission to Inpatient Care Most Recent : Anastacia Richey Most Recent Date: 12/4/2020 07:48:09 EST ( ) Admission is indicated for  1 or more  of the following  [A] [B] (1) (2) (3) (4) (5) (6) (7)   
(8) (9):   
   ( ) Systemic [A] manifestation, as indicated by  1 or more  of the following :   
      ( ) Inability to maintain oral hydration (eg, needs IV fluid support) that persists despite observation   
      care   
      12/4/2020 07:48:09 EST by Anastacia Richey   
        she has a history of dementia, has had decreased appetite over the last few days, complaining of lower extremity pain without a history of a fall

## 2020-12-09 NOTE — DISCHARGE SUMMARY
Discharge Summary     Patient: Alex Short MRN: [id number]  SSN: [id number]    YOB: 1935  Age: 82 y.o. Sex: female       Admit Date: 12/2/2020    Discharge Date: 12/9/2020      Admission Diagnoses: Suspected COVID-19 virus infection [Z20.828]  Weakness [R53.1]    Discharge Diagnoses:   Problem List as of 12/9/2020 Never Reviewed          Codes Class Noted - Resolved    Weakness ICD-10-CM: R53.1  ICD-9-CM: 780.79  12/8/2020 - Present        * (Principal) Suspected COVID-19 virus infection (Chronic) ICD-10-CM: P26.139  ICD-9-CM: V01.79  12/3/2020 - Present        DNR (do not resuscitate) ICD-10-CM: Z66  ICD-9-CM: V49.86  12/3/2020 - Present        Dementia associated with other underlying disease without behavioral disturbance ([facility] ICD-10-CM: F02.80  ICD-9-CM: 294.10  6/7/2019 - Present        Parkinsonism ([facility] (Chronic) ICD-10-CM: G20  ICD-9-CM: 332.0  6/7/2019 - Present               Discharge Condition: Fair    Hospital Course:   Ms. Jackie Arnold is an 83 y/o WF with a h/o dementia, Parkinsonism, LE edema who presented to the ER from [facility] on 12/2 with SOB and leg swelling. She had mild hypoxemia and CXR showing mild vascular congestion. US neg for DVT in either LE. COVID swab was negative. She was continued on home Bumex. O2 weaned off. Acute respiratory failure was ruled out. TTE showed normal EF. Her likely edema was multifactorial, poor ambulation, venous stasis. She remained clinically stable; and is stable to be transferred to her BOB. Recommend to continue to participate with PT/OT. She needs encouragement.      Physical exam:  General:          Well nourished. No overt distress. Lungs:             CTAB. No wheezing, rhonchi, or rales. Unlabored  Abdomen:        Soft, nontender, nondistended. Extremities:     Warm and dry. No cyanosis   Skin:                No rashes. No jaundice. Normal coloration  Neuro:             No gross focal deficits.   Alert     Consults: None    Significant Diagnostic Studies: see note     Disposition: SNF    Discharge Medications:   Current Discharge Medication List      START taking these medications    Details   acetaminophen (TYLENOL) 500 mg tablet Take 1.5 Tabs by mouth every six (6) hours as needed for Pain. Qty: 30 Tab, Refills: 0         CONTINUE these medications which have NOT CHANGED    Details   vit B Cmplx 3-FA-Vit C-Biotin (NEPHRO EVERETT RX) 1- mg-mg-mcg tablet Take 1 Tab by mouth daily. trolamine salicylate (Aspercreme) 10 % lotion Apply  to affected area three (3) times daily as needed. hydroCHLOROthiazide (HYDRODIURIL) 12.5 mg tablet Take 12.5 mg by mouth daily. furosemide (Lasix) 40 mg tablet Take 40 mg by mouth daily. magnesium oxide (MAG-OX) 400 mg tablet Take 400 mg by mouth daily. potassium chloride (MICRO-K PO) Take 4 Tabs by mouth daily. rivastigmine tartrate (EXELON) 3 mg capsule Take 3 mg by mouth two (2) times daily (with meals). estradiol (ESTRACE) 0.5 mg tablet Take  by mouth daily. memantine (NAMENDA) 10 mg tablet Take 10 mg by mouth two (2) times a day. escitalopram oxalate (LEXAPRO) 10 mg tablet Take 20 mg by mouth daily. aspirin delayed-release 81 mg tablet Take 81 mg by mouth daily. ergocalciferol (VITAMIN D2) 50,000 unit capsule Take 50,000 Units by mouth every seven (7) days. saturday      calcium-vitamin D3-vitamin K (VIACTIV) 650 mg-12.5 mcg-40 mcg chew Take  by mouth.      modafinil (PROVIGIL) 200 mg tablet Take 200 mg by mouth daily.          STOP taking these medications       ibuprofen 200 mg cap Comments:   Reason for Stopping:         traMADol (ULTRAM) 50 mg tablet Comments:   Reason for Stopping:         guaiFENesin (ROBITUSSIN) 100 mg/5 mL liquid Comments:   Reason for Stopping:         colchicine (COLCRYS) 0.6 mg tablet Comments:   Reason for Stopping:         bumetanide (BUMEX) 2 mg tablet Comments:   Reason for Stopping: carbidopa-levodopa (SINEMET)  mg per tablet Comments:   Reason for Stopping:         ascorbic acid, vitamin C, (VITAMIN C) 500 mg tablet Comments:   Reason for Stopping:               Activity: Activity as tolerated  Diet: Regular Diet  Wound Care: None needed    Follow-up Appointments   Procedures    FOLLOW UP VISIT Appointment in: One Week pcp     pcp     Standing Status:   Standing     Number of Occurrences:   1     Order Specific Question:   Appointment in     Answer:    One Week       Signed By: Manohar Esparza MD     December 9, 2020

## 2020-12-09 NOTE — PROGRESS NOTES
DC order noted, still pending authorization from insurance for admission to STR. email sent to Costa collins to check on auth. CM will remain available.

## 2020-12-10 VITALS
SYSTOLIC BLOOD PRESSURE: 148 MMHG | RESPIRATION RATE: 16 BRPM | WEIGHT: 74.5 LBS | TEMPERATURE: 98 F | OXYGEN SATURATION: 90 % | HEART RATE: 78 BPM | DIASTOLIC BLOOD PRESSURE: 85 MMHG

## 2020-12-10 PROCEDURE — 77030027138 HC INCENT SPIROMETER -A

## 2020-12-10 PROCEDURE — 74011250637 HC RX REV CODE- 250/637: Performed by: FAMILY MEDICINE

## 2020-12-10 PROCEDURE — 74011250637 HC RX REV CODE- 250/637: Performed by: INTERNAL MEDICINE

## 2020-12-10 PROCEDURE — 2709999900 HC NON-CHARGEABLE SUPPLY

## 2020-12-10 PROCEDURE — 97530 THERAPEUTIC ACTIVITIES: CPT

## 2020-12-10 PROCEDURE — 97112 NEUROMUSCULAR REEDUCATION: CPT

## 2020-12-10 PROCEDURE — 74011250636 HC RX REV CODE- 250/636: Performed by: FAMILY MEDICINE

## 2020-12-10 RX ADMIN — ASPIRIN 81 MG: 81 TABLET ORAL at 08:46

## 2020-12-10 RX ADMIN — ENOXAPARIN SODIUM 40 MG: 40 INJECTION SUBCUTANEOUS at 08:46

## 2020-12-10 RX ADMIN — OXYCODONE HYDROCHLORIDE 5 MG: 5 TABLET ORAL at 10:22

## 2020-12-10 RX ADMIN — BUMETANIDE 2 MG: 1 TABLET ORAL at 08:46

## 2020-12-10 RX ADMIN — FAMOTIDINE 20 MG: 20 TABLET, FILM COATED ORAL at 08:47

## 2020-12-10 RX ADMIN — Medication 10 ML: at 06:00

## 2020-12-10 RX ADMIN — POLYETHYLENE GLYCOL 3350 17 G: 17 POWDER, FOR SOLUTION ORAL at 08:46

## 2020-12-10 RX ADMIN — TRAMADOL HYDROCHLORIDE 50 MG: 50 TABLET, FILM COATED ORAL at 08:50

## 2020-12-10 RX ADMIN — COLCHICINE 0.6 MG: 0.6 TABLET, FILM COATED ORAL at 08:46

## 2020-12-10 RX ADMIN — MEMANTINE HYDROCHLORIDE 10 MG: 5 TABLET ORAL at 08:46

## 2020-12-10 RX ADMIN — RIVASTIGMINE TARTRATE 3 MG: 1.5 CAPSULE ORAL at 08:46

## 2020-12-10 RX ADMIN — ESCITALOPRAM OXALATE 10 MG: 10 TABLET ORAL at 08:46

## 2020-12-10 RX ADMIN — OXYCODONE HYDROCHLORIDE AND ACETAMINOPHEN 1000 MG: 500 TABLET ORAL at 08:46

## 2020-12-10 NOTE — PROGRESS NOTES
ACUTE PHYSICAL THERAPY GOALS:  (Developed with and agreed upon by patient and/or caregiver.)  Note: STG:  (1.)Ms. Keya Berman will move from supine to sit and sit to supine , scoot up and down, and roll side to side with MINIMAL ASSIST within 3 treatment day(s). (2.)Ms. Keya Breman will transfer from bed to chair and chair to bed with MODERATE ASSIST using the least restrictive device within 3 treatment day(s). (3.)Ms. Keya Berman will ambulate with MODERATE ASSIST for 2 feet with the least restrictive device within 3 treatment day(s). (4.)Ms. Keya Berman will perform seated static and dynamic balance activities x 15 minutes with STAND BY ASSIST to improve safety within 3 day(s). (5.)Ms. Keya Berman will perform standing static and dynamic balance activities x 5 minutes with MODERATE ASSIST to improve safety within 3 day(s). (6.)Ms. Keya Berman will maintain stable vital signs throughout all functional mobility within 3 days.     LTG:  (1.)Ms. Keya Berman will move from supine to sit and sit to supine , scoot up and down, and roll side to side in bed with CONTACT GUARD ASSIST within 7 treatment day(s). (2.)Ms. Keya Berman will transfer from bed to chair and chair to bed with CONTACT GUARD ASSIST using the least restrictive device within 7 treatment day(s). (3.)Ms. Keya Berman will ambulate with CONTACT GUARD ASSIST for 5 feet with the least restrictive device within 7 treatment day(s). (4.)Ms. Keya Berman will perform seated static and dynamic balance activities x 15 minutes with SUPERVISION to improve safety within 7 day(s). (5.)Ms. Keya Berman will perform standing static and dynamic balance activities x 5 minutes with CONTACT GUARD ASSIST to improve safety within 7 day(s). (6.)Ms. Keya Berman will ambulate and/or perform functional activities for 20 consecutive minutes with stable vital signs and no rests required to improve activity tolerance within 7 treatment days.  ________________________________________________________________________________________________      PHYSICAL THERAPY: Daily Note and AM Treatment Day # 4    Abner Haas is a 82 y.o. female   PRIMARY DIAGNOSIS: Suspected COVID-19 virus infection         ASSESSMENT:     REHAB RECOMMENDATIONS: CURRENT LEVEL OF FUNCTION:  (Most Recently Demonstrated)   Recommendation to date pending progress:  Setting:   Short-term Rehab  Equipment:    To Be Determined Bed Mobility:   Maximal Assistance  Sit to Stand:   Not tested  Transfers:   Not tested  Gait/Mobility:   Not tested     ASSESSMENT:  Ms. Tatyana Barrientos was anxious and confused on treatment today. She was complaining of left hip pain with any movement. SUBJECTIVE:   Ms. Tatyana Barrientos states, \"I can't do it\".     SOCIAL HISTORY/ LIVING ENVIRONMENT:   Home Environment: Assisted living  One/Two Story Residence: One story  Living Alone: No  Support Systems: Assisted living, Child(geovany)  OBJECTIVE:     PAIN: VITAL SIGNS: LINES/DRAINS:   Pre Treatment:    Post Treatment: 0   none  O2 Device: Room air     MOBILITY: I Mod I S SBA CGA Min Mod Max Total  NT x2 Comments:   Bed Mobility    Rolling [] [] [] [] [] [] [] [x] [] [] []    Supine to Sit [] [] [] [] [] [] [] [x] [] [] [x]    Scooting [] [] [] [] [] [] [] [x] [] [] [x]    Sit to Supine [] [] [] [] [] [] [] [x] [] [] [x]    Transfers    Sit to Stand [] [] [] [] [] [] [] [] [] [x] [] refused   Bed to Chair [] [] [] [] [] [] [] [] [] [x] [] refused   Stand to Sit [] [] [] [] [] [] [] [] [] [x] [] refused   I=Independent, Mod I=Modified Independent, S=Supervision, SBA=Standby Assistance, CGA=Contact Guard Assistance,   Min=Minimal Assistance, Mod=Moderate Assistance, Max=Maximal Assistance, Total=Total Assistance, NT=Not Tested    GAIT: I Mod I S SBA CGA Min Mod Max Total  NT Comments:   Level of Assistance [] [] [] [] [] [] [] [] [] [x]    Distance      DME N/A    Gait Quality      I=Independent, Mod I=Modified Independent, S=Supervision, SBA=Standby Assistance, CGA=Contact Guard Assistance,   Min=Minimal Assistance, Mod=Moderate Assistance, Max=Maximal Assistance, Total=Total Assistance, NT=Not Tested    PLAN:   FREQUENCY/DURATION: PT Plan of Care: 3 times/week for duration of hospital stay or until stated goals are met, which ever comes first.  TREATMENT:     TREATMENT:   ($$ Therapeutic Activity: 23-37 mins    )  Therapeutic Activity (25 Minutes): Therapeutic activity included Rolling, Supine to Sit, Sit to Supine, Scooting and Sitting balance  to improve functional Mobility, Strength, ROM and Activity tolerance.     AFTER TREATMENT POSITION/PRECAUTIONS:  Alarm Activated and Bed    INTERDISCIPLINARY COLLABORATION:  PT/PTA    TOTAL TREATMENT DURATION:  PT Patient Time In/Time Out  Time In: 1040  Time Out: [facility], PTA

## 2020-12-10 NOTE — PROGRESS NOTES
Received message from Oneyda Alexis at [facility] that pt has been denied for STR at this time. Peer to Peer option available. Information emailled to Dr Shelly Sullivan. Vernon Oro for review to complete peer to peer is possible. #[phone] option 3, must be completed by 430 pm today. CM will update family once review complete.

## 2020-12-10 NOTE — PROGRESS NOTES
Received call from daughter Goldy Leslie about pts dc, requested calls from Dr Tha Patton and Dr Romaine Hodgkins, both informed of daughters request. Goldy Leslie also asked for mom to be dc today back to [facility], ems transport set for 4 pm with [facility], orders for PT/OT and dc summary faxed to [facility]. Care Management Interventions  PCP Verified by CM:  Yes  Mode of Transport at Discharge: [facility]au[facility]-- [facility] Transport Time of Discharge: 1600  Physical Therapy Consult: Yes  Occupational Therapy Consult: No  Current Support Network: Assisted Living([facility] [address])  Confirm Follow Up Transport: Other (see comment)  The Plan for Transition of Care is Related to the Following Treatment Goals : PT/OT services requested at [facility] to increase mobility  The Patient and/or Patient Representative was Provided with a Choice of Provider and Agrees with the Discharge Plan?: Yes  Name of the Patient Representative Who was Provided with a Choice of Provider and Agrees with the Discharge Plan: daughter  Freedom of Choice List was Provided with Basic Dialogue that Supports the Patient's Individualized Plan of Care/Goals, Treatment Preferences and Shares the Quality Data Associated with the Providers?: Yes   Resource Information Provided?: No  Discharge Location  Discharge Placement: Assisted Living

## 2020-12-10 NOTE — PROGRESS NOTES
1150 am- received call from daughter Shari Petty, she has spoken with [facility] and they are willing to take pt back, made her aware clinical information has been faxed to facility as per request of Nnamdi Kevin and they should be letting one of us know about her returning to facility. Per daughter pt will need transport to go back to [facility] and will need orders for PT/OT to begin at facility once she is in the building. 11 am-- CM called to speak with pts daughter Shari Petty about denial for admission to SNF, options given for private pay for STR or pt return back to [facility] with therapy to see at facility. She was upset and wanted to know the numbers to contact to speak with someone with the insurance about denial peer to peer information provided explained to her the process that occurs with peer to peer request. [facility] she was told yesterday on her visit with pt that pt would be going to str yesterday by staff, explained to her that CM multiple times told staff that pt was still pending authorization for admission to STR and would not be going until this was completed. [facility] pt was medicated for transport but then transport never occurred. Explained to her that if this CM had know she was here with pt that CM would have come to explain this to her at that time. 1030 am received call from Dr Lexie Mendez stating that he does not feel there is enough clinical information in chart to support a peer to peer, asked to have Dr Claire Fenton call if she had different feelings on this. Dr Claire Fenton informed and has agreed with Dr Lexie Mendez. CM called and spoke with Agueda at [facility] about pt returning to [facility] made aware that pt will need to be able to transfer from bed to [facility] and WC back to bed by herself, she would need to be able to feed self and assist with dsg. Per Darrius Gaines RN pt is able to feed self and assist with dsg self. States would need assist for transfers at this time.

## 2020-12-10 NOTE — PROGRESS NOTES
ACUTE OT GOALS:  (Developed with and agreed upon by patient and/or caregiver.)  1. Patient will complete lower body bathing and dressing with Mod A and adaptive equipment as needed. 2. Patient will complete toileting with Mod A and adaptive equipment as needed. 3. Patient will complete bed mobility with Min A and minimal verbal cueing. 4. Patient will complete functional transfers with Mod A and adaptive equipment as needed. 5. Patient will complete seated grooming ADL with Set Up and minimal verbal cues. Timeframe: 7 visits     OCCUPATIONAL THERAPY ASSESSMENT: Daily Note OT Treatment Day # 2    Renelda Bosworth is a 82 y.o. female   PRIMARY DIAGNOSIS: Suspected COVID-19 virus infection    Reason for Referral:  Generalized weakness  ICD-10: Treatment Diagnosis: Generalized Muscle Weakness (M62.81)  Difficulty in walking, Not elsewhere classified (R26.2)  INPATIENT: Payor: Kim Jean / Plan: Bob Billingsley / Product Type: Managed Care Medicare /   ASSESSMENT:     REHAB RECOMMENDATIONS:   Recommendation to date pending progress:  Setting:   Short-term Rehab  Equipment:    3 in 1 Bedside Commode   Rolling Walker     INITIAL/CURRENT LEVEL OF FUNCTION:  (Most Recently Demonstrated) PRIOR LEVEL OF FUNCTION:  (Prior to Hospitalization)   Bathing:   Moderate Assistance  Dressing:   Moderate Assistance  Feeding/Grooming:   Minimal Assistance  Toileting:   Total Assistance Bathing:   Not tested  Dressing:   Not tested  Feeding/Grooming:   Not tested  Toileting:   Not tested     ASSESSMENT:  Ms. Nazario Conti continue to be very anxious with mobility. C/o pain in her hip as well. Pt continue to require max assist x2 for all mobility and transfers at this time. Pt is confused and requires encouragement to participate with therapy.      SUBJECTIVE:   Ms. Nazario Conti states, \"I can't do it\"    SOCIAL HISTORY/LIVING ENVIRONMENT:   Home Environment: Assisted living  One/Two Story Residence: One story  Living Alone: No  Support Systems: Assisted living, Child(geovany) [facility]). Pt has PMH of Dementia. OBJECTIVE:     PAIN: VITAL SIGNS: LINES/DRAINS:   Pre Treatment: Pain Screen  Pain Scale 1: Visual  Pain Location 1: Hip  Post Treatment: Unchanged. Pt does have increased pain in LLE with movement. Purewick  O2 Device: Room air     GROSS EVALUATION:  BUE Within Functional Limits Abnormal/ Functional Abnormal/ Non-Functional (see comments) Not Tested Comments:   AROM [] [x] [] []    PROM [x] [] [] []    Strength [] [x] [] []    Balance [] [x] [] [] Seated: Fair/Poor; Standing: Not tested   Posture [] [x] [] []    Sensation [] [] [] [x]    Coordination [] [x] [] []    Tone [x] [] [] []    Edema [x] [] [] []    Activity Tolerance [] [x] [] []     [] [] [] []      COGNITION/  PERCEPTION: Intact Impaired   (see comments) Comments:   Orientation [] [x]    Vision [x] []    Hearing [x] []    Judgment/ Insight [] [x]    Attention [] [x]    Memory [] [x]    Command Following [] [x] Requires additional cueing.    Emotional Regulation [] [x] Pt presents slightly tearful with mobility.    [] []      ACTIVITIES OF DAILY LIVING: I Mod I S SBA CGA Min Mod Max Total NT Comments   BASIC ADLs:              Bathing/ Showering [] [] [] [] [] [] [x] [] [] []    Toileting [] [] [] [] [] [] [] [] [x] []    Dressing [] [] [] [] [] [] [x] [] [] []    Feeding [] [] [] [] [] [x] [] [] [] []    Grooming [] [] [] [] [] [x] [] [] [] []    Personal Device Care [] [] [] [] [] [] [] [] [] [x]    Functional Mobility [] [] [] [] [] [] [] [x] [] [] Max A x 2 transfers/bed mobility   INSTRUMENTAL ADLs:              Care of Others [] [] [] [] [] [] [] [] [x] []    Community Mobility [] [] [] [] [] [] [] [] [x] []    Financial Management [] [] [] [] [] [] [] [] [x] []    Home Management [] [] [] [] [] [] [] [] [x] []    Meal Preparation [] [] [] [] [] [] [] [] [x] []    Health Management [] [] [] [] [] [] [] [] [x] []    Work/Leisure [] [] [] [] [] [] [] [] [x] []    I=Independent, Mod I=Modified Independent, S=Supervision, SBA=Standby Assistance, CGA=Contact Guard Assistance,   Min=Minimal Assistance, Mod=Moderate Assistance, Max=Maximal Assistance, Total=Total Assistance, NT=Not Tested    MOBILITY: I Mod I S SBA CGA Min Mod Max Total  NT x2 Comments:   Supine to sit [] [] [] [] [] [] [] [x] [] [] []    Sit to supine [] [] [] [] [] [] [] [x] [] [] [x]    Sit to stand [] [] [] [] [] [] [] [x] [] [] [x]    Bed to chair [] [] [] [] [] [] [] [] [] [x] []    I=Independent, Mod I=Modified Independent, S=Supervision, SBA=Standby Assistance, CGA=Contact Guard Assistance,   Min=Minimal Assistance, Mod=Moderate Assistance, Max=Maximal Assistance, Total=Total Assistance, NT=Not Tested    [address] [place] [address] AM-PAC 6 Clicks   Daily Activity Inpatient Short Form        How much help from another person does the patient currently need. .. Total A Lot A Little None   1. Putting on and taking off regular lower body clothing? [x] 1   [] 2   [] 3   [] 4   2. Bathing (including washing, rinsing, drying)? [] 1   [x] 2   [] 3   [] 4   3. Toileting, which includes using toilet, bedpan or urinal?   [x] 1   [] 2   [] 3   [] 4   4. Putting on and taking off regular upper body clothing? [] 1   [x] 2   [] 3   [] 4   5. Taking care of personal grooming such as brushing teeth? [] 1   [] 2   [x] 3   [] 4   6. Eating meals? [] 1   [] 2   [x] 3   [] 4   © 2007, Trustees of [address] [place] [address], under license to [employer]. All rights reserved     Score:  Initial: 12, completed, 12/8/2020 Most Recent: X (Date: -- )   Interpretation of Tool:  Represents activities that are increasingly more difficult (i.e. Bed mobility, Transfers, Gait).     PLAN:   FREQUENCY/DURATION: OT Plan of Care: 3 times/week for duration of hospital stay or until stated goals are met, which ever comes first.    PROBLEM LIST:   (Skilled intervention is medically necessary to address:)  1. Decreased ADL/Functional Activities  2. Decreased Activity Tolerance  3. Decreased AROM/PROM  4. Decreased Balance  5. Decreased Cognition  6. Decreased Coordination  7. Decreased Gait Ability  8. Decreased Strength  9. Decreased Transfer Abilities  10. Increased Pain   INTERVENTIONS PLANNED:   (Benefits and precautions of occupational therapy have been discussed with the patient.)  1. Self Care Training  2. Therapeutic Activity  3. Therapeutic Exercise/HEP  4. Neuromuscular Re-education  5. Education     TREATMENT:     EVALUATION: Low Complexity : (Untimed Charge)    TREATMENT:      Neuromuscular Re-education: (25 minutes):  Exercise/activities per grid below to improve balance, coordination and posture. Required minimal visual, verbal and tactile cues to promote static balance in sitting. Pt has fair static and dynamic seated balance with use of propped sitting for increased support. At this time, patient is appropriate for Co-treatment with physical therapy due to patient's decreased overall endurance/tolerance levels, as well as need for high level skilled assistance to complete functional transfers/mobility and functional tasks. Ramila Redd is appropriate for a multidisciplinary co-treatment of PT and OT to address goals of both disciplines.      AFTER TREATMENT POSITION/PRECAUTIONS:  Alarm Activated, Bed, Needs within reach and RN notified    INTERDISCIPLINARY COLLABORATION:  RN/PCT, PT/PTA and OT/CHAMBERLAIN    TOTAL TREATMENT DURATION:  OT Patient Time In/Time Out  Time In: 1040  Time Out: 13[address]leon

## 2020-12-10 NOTE — PROGRESS NOTES
Physician Progress Note      PATIENT:               Zara Sanchez  CSN #:                  [id number]  :                       1935  ADMIT DATE:       2020 4:54 PM  [address] [place] DATE:  RESPONDING  PROVIDER #:        Tracey Lucas MD          QUERY TEXT:    Patient admitted with suspected COVID-19 virus infection . Noted documentation of acute respiratory failure in progress note on 20. Please indicate one of the following and document in the medical record: The medical record reflects the following:  Risk Factors: suspected COVID-19 infection  Clinical Indicators: documentation: \"Lungs: Clear to auscultation bilaterally. On 1 LNC\" respirations 18, \"no evidence of fluid overload at this point\" , CXR: \"Suspect mild interstitial edema. \" ER documentation: \" Patient O2 saturation dropped to 88% on room air while patient was at rest\"  Treatment: 02 per nasal cannula    Acute Respiratory Failure Clinical Indicators per [employer]-DRG Training Guide and Quick Reference Guide:  pCO2 > 50 and pH < 7.35  P/F ratio (pO2 / FIO2) < 300  pO2 decrease or pCO2 increase by 10 mmHg from baseline (if known)  Supplemental oxygen of 40% or more  Presence of respiratory distress, tachypnea, dyspnea, shortness of breath, wheezing  Unable to speak in complete sentences  Use of accessory muscles to breathe  Extreme anxiety and feeling of impending doom  Tripod position  Options provided:  -- Acute Respiratory Failure currently as evidenced by, Please document evidence. -- Currently resolved Acute Respiratory Failure was evidenced by, Please document evidence  -- Acute Respiratory Failure ruled out after study  -- Other - I will add my own diagnosis  -- Disagree - Not applicable / Not valid  -- Disagree - Clinically unable to determine / Unknown  -- Refer to Clinical Documentation Reviewer    PROVIDER RESPONSE TEXT:    Acute Respiratory Failure has been ruled out after study.     Query created by: Kayla Nogueira on 2020 9:39 AM      Electronically signed by:  Ramon Lebron MD 12/10/2020 7:49 AM

## 2020-12-10 NOTE — PROGRESS NOTES
Hospitalist Note     Admit Date:  2020  4:54 PM   Name:  Ramila Redd   Age:  82 y.o.  :  1935   MRN:  [id number]   PCP:  Kassidy Shaffer NP  Treatment Team: Attending Provider: Sin Salcido MD; Utilization Review: Shilpa Betancourt RN; Utilization Review: Star Alfaro, ALIREZA; Care Manager: Annita Marin RN; Primary Nurse: Mickie Topete; Physical Therapy Assistant: Jocy Riggs; Occupational Therapy Assistant: ANDERS Chopra     Ms. Gardner Rater is an 83 y/o WF with a h/o dementia, Parkinsonism, LE edema who presented to the ER from [facility] on  with SOB and leg swelling. She had mild hypoxemia and CXR showing mild vascular congestion. US neg for DVT in either LE. COVID swab is negative. She was continued on home Bumex. O2 weaned off. TTE showed normal EF. Plan is for STR soon. Subjective:     She had no complains. Objective:     Patient Vitals for the past 24 hrs:   Temp Pulse Resp BP SpO2   12/10/20 1126 98 °F (36.7 °C) 78 16 (!) 148/85 90 %   12/10/20 0717 97.6 °F (36.4 °C) 61 16 (!) 144/66 93 %   12/10/20 0431 97.8 °F (36.6 °C) 62 16 124/66 92 %   12/10/20 0043 98.1 °F (36.7 °C) 63 16 138/71 95 %   20 1947 98.1 °F (36.7 °C) 71 16 (!) 143/75 91 %   20 1545 98.4 °F (36.9 °C) 75 16 (!) 142/73 91 %     Oxygen Therapy  O2 Sat (%): 90 % (12/10/20 1126)  Pulse via Oximetry: 70 beats per minute (20 0813)  O2 Device: Room air (12/10/20 0110)  O2 Flow Rate (L/min): 1 l/min (20 0815)    There is no height or weight on file to calculate BMI. Intake/Output Summary (Last 24 hours) at 12/10/2020 1537  Last data filed at 12/10/2020 0431  Gross per 24 hour   Intake    Output 250 ml   Net -250 ml       *Note that automatically entered I/Os may not be accurate; dependent on patient compliance with collection and accurate  by techs. General:    Well nourished. No overt distress. Angry. CV:   RRR. No m/r/g. No edema.   No JVD  Lungs:   CTAB. No wheezing, rhonchi, or rales. Unlabored  Abdomen:   Soft, nontender, nondistended. Extremities: Warm and dry. No cyanosis   Skin:     No rashes. No jaundice. Normal coloration  Neuro:  No gross focal deficits. Alert    Data Reviewed:  I have reviewed all labs, meds, and studies from the last 24 hours:  No results found for this or any previous visit (from the past 24 hour(s)).     Current Meds:  Current Facility-Administered Medications   Medication Dose Route Frequency    influenza vaccine 2020-21 (6 mos+)(PF) (FLUARIX/FLULAVAL/FLUZONE QUAD) injection 0.5 mL  0.5 mL IntraMUSCular PRIOR TO DISCHARGE    LORazepam (ATIVAN) injection 1 mg  1 mg IntraVENous Q4H PRN    ascorbic acid (vitamin C) (VITAMIN C) tablet 1,000 mg  1,000 mg Oral DAILY    aspirin delayed-release tablet 81 mg  81 mg Oral DAILY    bumetanide (BUMEX) tablet 2 mg  2 mg Oral DAILY    carbidopa-levodopa (SINEMET)  mg per tablet 1 Tab  1 Tab Oral TID    colchicine tablet 0.6 mg  0.6 mg Oral DAILY    escitalopram oxalate (LEXAPRO) tablet 10 mg  10 mg Oral DAILY    guaiFENesin (ROBITUSSIN) 100 mg/5 mL oral liquid 200 mg  200 mg Oral TID PRN    memantine (NAMENDA) tablet 10 mg  10 mg Oral Q12H    rivastigmine tartrate (EXELON) capsule 3 mg  3 mg Oral BID WITH MEALS    traMADoL (ULTRAM) tablet 50 mg  50 mg Oral Q6H PRN    sodium chloride (NS) flush 5-40 mL  5-40 mL IntraVENous Q8H    sodium chloride (NS) flush 5-40 mL  5-40 mL IntraVENous PRN    acetaminophen (TYLENOL) tablet 650 mg  650 mg Oral Q6H PRN    Or    acetaminophen (TYLENOL) suppository 650 mg  650 mg Rectal Q6H PRN    polyethylene glycol (MIRALAX) packet 17 g  17 g Oral DAILY    promethazine (PHENERGAN) tablet 12.5 mg  12.5 mg Oral Q6H PRN    Or    ondansetron (ZOFRAN) injection 4 mg  4 mg IntraVENous Q6H PRN    enoxaparin (LOVENOX) injection 40 mg  40 mg SubCUTAneous DAILY    oxyCODONE IR (ROXICODONE) tablet 5 mg  5 mg Oral Q4H PRN    famotidine (PEPCID) tablet 20 mg  20 mg Oral DAILY       Other Studies:  Results for orders placed or performed during the hospital encounter of 20   2D ECHO COMPLETE ADULT (TTE) W OR WO CONTR    Narrative    Sha Zaldivar [address], [address] [facility]  [phone]    Transthoracic Echocardiogram  2D, M-mode, Doppler, and Color Doppler    Patient: Charla Carpenter  MR #: [id number]  : 1935  Age: 82 years  Gender: Female  Study date: 06-Dec-2020  Account #: [de-identified]  Height: 63 in  Weight: 150 lb  BSA: 1.71 mï¾²  Status:Routine  Location: 219  BP: 141/ 64    Allergies: CORTISONE, MORPHINE, SULFA (SULFONAMIDE ANTIBIOTICS)    Sonographer:  Lai Leyva [facility]  Group:  [id number] [facility]  Referring Physician:  Amalia Bach MD  Reading Physician:  Catalina Prince MD [facility]    INDICATIONS: Pulmonary edema    PROCEDURE: This was a routine study. A transthoracic echocardiogram was  performed. The study included complete 2D imaging, M-mode, complete spectral  Doppler, and color Doppler. Image quality was adequate. LEFT VENTRICLE: Size was normal. Systolic function was normal. Ejection  fraction was estimated in the range of 55 % to 60 %. There were no regional  wall motion abnormalities. Wall thickness was normal. Left ventricular  diastolic function parameters were normal. Avg E/e': 11.5. RIGHT VENTRICLE: The size was normal. Systolic function was normal. The  tricuspid jet envelope definition was inadequate for estimation of RV   systolic  pressure. LEFT ATRIUM: Size was normal.    RIGHT ATRIUM: Not well visualized. SYSTEMIC VEINS: IVC: The inferior vena cava was not well visualized. AORTIC VALVE: The valve was structurally normal, tri-commissural. There was   no  evidence for stenosis. There was no insufficiency. MITRAL VALVE: Valve structure was normal. There was no evidence for stenosis. There was trivial regurgitation.     TRICUSPID VALVE: The valve structure was normal. There was no evidence for  stenosis. There was trivial regurgitation. PULMONIC VALVE: Not well visualized. There was no evidence for stenosis. There  was no insufficiency. PERICARDIUM: There was no pericardial effusion. AORTA: The root exhibited normal size. SUMMARY:    -  Left ventricle: Systolic function was normal. Ejection fraction was  estimated in the range of 55 % to 60 %. There were no regional wall motion  abnormalities. SYSTEM MEASUREMENT TABLES    2D  Ao Diam: 3.2 cm  LA Diam: 3.1 cm  %FS: 41.3 %  IVSd: 1 cm  LVIDd: 4.2 cm  LVIDs: 2.4 cm  LVOT Diam: 1.8 cm  LVPWd: 1 cm    Prepared and signed by    Alessandro Berg MD [facility]  Signed 06-Dec-2020 19:44:05         No results found. All Micro Results     None          SARS-CoV-2 Lab Results  \"Novel Coronavirus\" Test: No results found for: COV2NT   \"Emergent Disease\" Test: No results found for: EDPR  \"SARS-COV-2\" Test: No results found for: XGCOVT  Rapid Test: No results found for: COVR         Assessment and Plan:     Hospital Problems as of 12/10/2020 Never Reviewed          Codes Class Noted - Resolved POA    Weakness ICD-10-CM: R53.1  ICD-9-CM: 780.79  12/8/2020 - Present Unknown        * (Principal) Suspected COVID-19 virus infection (Chronic) ICD-10-CM: E49.320  ICD-9-CM: V01.79  12/3/2020 - Present Yes        DNR (do not resuscitate) ICD-10-CM: Z66  ICD-9-CM: V49.86  12/3/2020 - Present Yes        Dementia associated with other underlying disease without behavioral disturbance ([facility] [address] ICD-10-CM: F02.80  ICD-9-CM: 294.10  6/7/2019 - Present Yes        Parkinsonism ([facility] [address] (Chronic) ICD-10-CM: G20  ICD-9-CM: 332.0  6/7/2019 - Present Yes              Plan:  # Acute hypoxemic respiratory failure              - Resolved. COVID negative, on PO Bumex, now on RA. TTE normal.     # B/l LE edema              - TTE normal. US neg for DVT in either leg.  Bumex.     # Dementia/Parkinsonism              - Home meds.    DC planning/Dispo: Medically stable for discharge, STR soon     Diet:  DIET REGULAR  DVT ppx: Lovenox      Signed:  Lawrence Fuentes MD

## 2020-12-10 NOTE — PROGRESS NOTES
Problem: Patient Education: Go to Patient Education Activity  Goal: Patient/Family Education  Outcome: Progressing Towards Goal     Problem: Falls - Risk of  Goal: *Absence of Falls  Description: Document Ghulam Click Fall Risk and appropriate interventions in the flowsheet. Outcome: Progressing Towards Goal  Note: Fall Risk Interventions:  Mobility Interventions: Communicate number of staff needed for ambulation/transfer, Patient to call before getting OOB    Mentation Interventions: More frequent rounding, Door open when patient unattended    Medication Interventions: Teach patient to arise slowly, Patient to call before getting OOB    Elimination Interventions: Call light in reach, Toileting schedule/hourly rounds              Problem: Patient Education: Go to Patient Education Activity  Goal: Patient/Family Education  Outcome: Progressing Towards Goal     Problem: Pressure Injury - Risk of  Goal: *Prevention of pressure injury  Description: Document Barrera Scale and appropriate interventions in the flowsheet.   Outcome: Progressing Towards Goal  Note: Pressure Injury Interventions:  Sensory Interventions: Assess changes in LOC, Pressure redistribution bed/mattress (bed type), Minimize linen layers    Moisture Interventions: Absorbent underpads, Apply protective barrier, creams and emollients, Check for incontinence Q2 hours and as needed    Activity Interventions: Increase time out of bed, Pressure redistribution bed/mattress(bed type)    Mobility Interventions: HOB 30 degrees or less, Pressure redistribution bed/mattress (bed type)    Nutrition Interventions: Offer support with meals,snacks and hydration, Document food/fluid/supplement intake    Friction and Shear Interventions: Apply protective barrier, creams and emollients                Problem: Patient Education: Go to Patient Education Activity  Goal: Patient/Family Education  Outcome: Progressing Towards Goal     Problem: Patient Education: Go to Patient Education Activity  Goal: Patient/Family Education  Outcome: Progressing Towards Goal

## 2020-12-10 NOTE — PROGRESS NOTES
END OF SHIFT NOTE:    INTAKE/OUTPUT  12/09 0701 - 12/10 0700  In: -   Out: 250 [Urine:250]  Voiding: YES  Catheter: YES (External)  Drain:   External Female Catheter 12/09/20 (Active)   Site Assessment Clean, dry, & intact 12/10/20 0110   Repositioned No 12/10/20 0110   Perineal Care No 12/10/20 0110   Wick Changed No 12/10/20 0110   Suction Canister/Tubing Changed No 12/10/20 0110   Urine Output (mL) 150 ml 12/10/20 0431               Flatus: Patient does have flatus present. Stool:  0 occurrences. Characteristics:       Emesis: 0 occurrences. Characteristics:        VITAL SIGNS  Patient Vitals for the past 12 hrs:   Temp Pulse Resp BP SpO2   12/10/20 0431 97.8 °F (36.6 °C) 62 16 124/66 92 %   12/10/20 0043 98.1 °F (36.7 °C) 63 16 138/71 95 %   12/09/20 1947 98.1 °F (36.7 °C) 71 16 (!) 143/75 91 %       Pain Assessment  Pain Intensity 1: 0 (12/10/20 0110)  Pain Location 1: Hip  Pain Intervention(s) 1: Medication (see MAR)  Patient Stated Pain Goal: 0    Ambulating  No    Hourly rounding on patient. Shift report given to oncoming nurse at the bedside.     Renee Coronado

## 2023-06-22 NOTE — PROGRESS NOTES
Bed: 12  Expected date:   Expected time:   Means of arrival:   Comments:  Seizure     END OF SHIFT NOTE:    INTAKE/OUTPUT  12/08 0701 - 12/09 0700  In: 480 [P.O.:480]  Out: 600 [Urine:600]  Voiding: YES  Catheter: NO  Drain:              Flatus: Patient does have flatus present. Stool:  0 occurrences. Characteristics:       Emesis: 0 occurrences. Characteristics:        VITAL SIGNS  Patient Vitals for the past 12 hrs:   Temp Pulse Resp BP SpO2   12/08/20 2341 97.7 °F (36.5 °C) 73 16 131/67 90 %   12/08/20 2008 98.3 °F (36.8 °C) 73 16 (!) 145/77 91 %       Pain Assessment  Pain Intensity 1: 0 (12/09/20 0110)  Pain Location 1: Hip  Pain Intervention(s) 1: Medication (see MAR)  Patient Stated Pain Goal: 0    Ambulating  No    Hourly rounding on patient. Shift report given to oncoming nurse at the bedside.     Wendie Leal